# Patient Record
Sex: FEMALE | Race: WHITE | NOT HISPANIC OR LATINO | Employment: FULL TIME | URBAN - NONMETROPOLITAN AREA
[De-identification: names, ages, dates, MRNs, and addresses within clinical notes are randomized per-mention and may not be internally consistent; named-entity substitution may affect disease eponyms.]

---

## 2017-03-08 DIAGNOSIS — N91.2 AMENORRHEA: Primary | ICD-10-CM

## 2017-03-08 LAB — B-HCG UR QL: NEGATIVE

## 2017-03-08 PROCEDURE — 81025 URINE PREGNANCY TEST: CPT | Performed by: INTERNAL MEDICINE

## 2017-07-14 ENCOUNTER — OFFICE VISIT (OUTPATIENT)
Dept: OBSTETRICS AND GYNECOLOGY | Facility: CLINIC | Age: 32
End: 2017-07-14

## 2017-07-14 VITALS
HEART RATE: 81 BPM | DIASTOLIC BLOOD PRESSURE: 64 MMHG | RESPIRATION RATE: 16 BRPM | HEIGHT: 69 IN | WEIGHT: 155.8 LBS | SYSTOLIC BLOOD PRESSURE: 102 MMHG | BODY MASS INDEX: 23.08 KG/M2

## 2017-07-14 DIAGNOSIS — N76.0 BV (BACTERIAL VAGINOSIS): Primary | ICD-10-CM

## 2017-07-14 DIAGNOSIS — B96.89 BV (BACTERIAL VAGINOSIS): Primary | ICD-10-CM

## 2017-07-14 DIAGNOSIS — Z97.5 IUD (INTRAUTERINE DEVICE) IN PLACE: ICD-10-CM

## 2017-07-14 PROCEDURE — 87210 SMEAR WET MOUNT SALINE/INK: CPT | Performed by: NURSE PRACTITIONER

## 2017-07-14 PROCEDURE — 99213 OFFICE O/P EST LOW 20 MIN: CPT | Performed by: NURSE PRACTITIONER

## 2017-07-14 RX ORDER — METRONIDAZOLE 500 MG/1
500 TABLET ORAL 2 TIMES DAILY
Qty: 14 TABLET | Refills: 0 | Status: SHIPPED | OUTPATIENT
Start: 2017-07-14 | End: 2017-07-21

## 2017-07-14 RX ORDER — FLUCONAZOLE 150 MG/1
150 TABLET ORAL ONCE
Qty: 2 TABLET | Refills: 0 | Status: SHIPPED | OUTPATIENT
Start: 2017-07-14 | End: 2018-02-16 | Stop reason: SDUPTHER

## 2017-07-14 NOTE — PROGRESS NOTES
Subjective   Aide Jenkins is a 31 y.o. female.     History of Present Illness   Pt presents with complaints of cloudy/yellow vaginal discharge, external itching and significant cramping x 1 week. Pt has Mirena IUD. Placed 8/18/16 by Dr. Smith. She also has not been able to feel strings despite being able to feel them before. Denies spotting or bleeding.     Pt is due for well woman but will reschedule due to current concerns.     The following portions of the patient's history were reviewed and updated as appropriate: allergies, current medications, past family history, past medical history, past social history, past surgical history and problem list.    Review of Systems   Constitutional: Negative.  Negative for chills and fever.   Respiratory: Negative.    Cardiovascular: Negative.    Gastrointestinal: Negative.  Negative for abdominal pain, constipation and diarrhea.   Genitourinary: Positive for pelvic pain and vaginal discharge. Negative for dyspareunia, dysuria, genital sores, menstrual problem, urgency, vaginal bleeding and vaginal pain.   Skin: Negative.    Neurological: Negative for dizziness, syncope, light-headedness and headaches.   Psychiatric/Behavioral: Negative for suicidal ideas. The patient is nervous/anxious.        Objective   Physical Exam   Constitutional: She is oriented to person, place, and time. She appears well-developed and well-nourished.   Cardiovascular: Normal rate, regular rhythm and normal heart sounds.    Pulmonary/Chest: Effort normal and breath sounds normal.   Abdominal: Soft. Normal appearance and bowel sounds are normal. There is no tenderness.   Genitourinary: There is no rash, tenderness, lesion or injury on the right labia. There is no rash, tenderness, lesion or injury on the left labia. Uterus is tender. Uterus is not deviated and not enlarged. Cervix exhibits no motion tenderness and no discharge. Right adnexum displays no mass, no tenderness and no fullness.  Left adnexum displays no mass, no tenderness and no fullness. No erythema, tenderness or bleeding in the vagina. No foreign body in the vagina. Vaginal discharge (copious yellow/cloudy discharge, mild odor, wet prep obtained) found.   Genitourinary Comments: IUD string visualized 2 cm from cervical os. Wet prep results: clue cells TNTC, numerous WBC, no yeast buds, hyphae, or trichomonads. Evaluated by TALIB Pastor.    Neurological: She is alert and oriented to person, place, and time.   Psychiatric: She has a normal mood and affect. Her behavior is normal.   Vitals reviewed.      Assessment/Plan   Aide was seen today for vaginal discharge, vulvar itching and pelvic pain.    Diagnoses and all orders for this visit:    BV (bacterial vaginosis)  -     metroNIDAZOLE (FLAGYL) 500 MG tablet; Take 1 tablet by mouth 2 (Two) Times a Day for 7 days. No alcohol up to 48 hours after last dose  -     fluconazole (DIFLUCAN) 150 MG tablet; Take 1 tablet by mouth 1 (One) Time for 1 dose. Repeat dose in 72 hours if still symptomatic    IUD (intrauterine device) in place     1. Flagyl 500mg BID x 7 days. Discussed NO alcohol up to 48 hrs after last dose of Flagyl. Pt verbalizes understanding. Discussed vulvar hygiene at length.  If symptoms persist or pelvic pain worsens, consider further testing and US.   2. IUD in place. Continue to check strings. RTC with any concerns or schedule pap smear at earliest convenience. Pt is in agreement with plan of care.

## 2017-07-19 ENCOUNTER — OFFICE VISIT (OUTPATIENT)
Dept: FAMILY MEDICINE CLINIC | Facility: CLINIC | Age: 32
End: 2017-07-19

## 2017-07-19 VITALS
DIASTOLIC BLOOD PRESSURE: 60 MMHG | TEMPERATURE: 99.2 F | HEIGHT: 69 IN | BODY MASS INDEX: 23.28 KG/M2 | HEART RATE: 64 BPM | WEIGHT: 157.2 LBS | SYSTOLIC BLOOD PRESSURE: 102 MMHG

## 2017-07-19 DIAGNOSIS — M77.11 LATERAL EPICONDYLITIS OF RIGHT ELBOW: Primary | ICD-10-CM

## 2017-07-19 PROCEDURE — 99213 OFFICE O/P EST LOW 20 MIN: CPT | Performed by: INTERNAL MEDICINE

## 2017-07-19 PROCEDURE — 96372 THER/PROPH/DIAG INJ SC/IM: CPT | Performed by: INTERNAL MEDICINE

## 2017-07-19 RX ORDER — METHYLPREDNISOLONE ACETATE 80 MG/ML
80 INJECTION, SUSPENSION INTRA-ARTICULAR; INTRALESIONAL; INTRAMUSCULAR; SOFT TISSUE ONCE
Status: COMPLETED | OUTPATIENT
Start: 2017-07-19 | End: 2017-07-19

## 2017-07-19 RX ORDER — MELOXICAM 15 MG/1
15 TABLET ORAL DAILY PRN
Qty: 30 TABLET | Refills: 11 | Status: SHIPPED | OUTPATIENT
Start: 2017-07-19 | End: 2017-11-20

## 2017-07-19 RX ADMIN — METHYLPREDNISOLONE ACETATE 80 MG: 80 INJECTION, SUSPENSION INTRA-ARTICULAR; INTRALESIONAL; INTRAMUSCULAR; SOFT TISSUE at 13:48

## 2017-07-19 NOTE — PROGRESS NOTES
"Subjective        History of Present Illness     Aide Jenkins is a 31 y.o. female here today reporting acute onset of right lateral elbow pain 4-5 weeks ago. She has difficulty with hyperextending the right upper extremity making it difficult for her to  her 1-year-old son.  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  She has not taken anything for the discomfort.  Exam reveals symptoms consistent with right lateral epicondylitis, which I recommended she try to reposition to support the elbow or try a compression sleeve.  She states this is difficulty since her job requires her to stand at all times. She will try anti-inflammatory Mobic and we will give her a steroid shot today in the right deltoid. We may refer to orthopedist if she doesn't get relief with this plan.        Review of Systems   Constitutional: Negative for chills, fatigue and fever.   HENT: Negative for congestion, ear pain, postnasal drip, sinus pressure and sore throat.    Respiratory: Negative for cough, shortness of breath and wheezing.    Cardiovascular: Negative for chest pain, palpitations and leg swelling.   Gastrointestinal: Negative for abdominal pain, blood in stool, constipation, diarrhea, nausea and vomiting.   Endocrine: Negative for cold intolerance, heat intolerance, polydipsia and polyuria.   Genitourinary: Negative for dysuria, frequency, hematuria and urgency.   Musculoskeletal:        Right elbow pain.    Skin: Negative for rash.   Neurological: Negative for syncope and weakness.      PHQ-9 Depression Screening 7/19/2017   Little interest or pleasure in doing things 0   Feeling down, depressed, or hopeless 0   PHQ-9 Total Score 0       Objective     Vitals:    07/19/17 1319   BP: 102/60   Pulse: 64   Temp: 99.2 °F (37.3 °C)   TempSrc: Oral   Weight: 157 lb 3.2 oz (71.3 kg)   Height: 69\" (175.3 cm) "       Physical Exam   Constitutional: She is oriented to person, place, and time. She appears well-developed and well-nourished. No distress.   HENT:   Head: Normocephalic and atraumatic.   Nose: Nose normal.   Mouth/Throat: Oropharynx is clear and moist. No oropharyngeal exudate.   Eyes: EOM are normal. Pupils are equal, round, and reactive to light.   Neck: Neck supple. No JVD present. No thyromegaly present.   Cardiovascular: Normal rate, regular rhythm and normal heart sounds.    Pulmonary/Chest: Effort normal and breath sounds normal. No accessory muscle usage. No respiratory distress. She has no wheezes. She has no rales.   Abdominal: Soft. Bowel sounds are normal. She exhibits no distension. There is no tenderness.   Musculoskeletal: She exhibits no edema.   Lymphadenopathy:     She has no cervical adenopathy.   Neurological: She is alert and oriented to person, place, and time. No cranial nerve deficit.   Psychiatric: She has a normal mood and affect. Her speech is normal and behavior is normal.       Assessment/Plan      Recommended a compression sleeve and also trying to reposition the right elbow to give support.   If pain does not resolve with steroids and NSAIDs, we can refer to orthopedist.       Mobic 15 mg q.d. with food.  Monitor for gastritis symptoms.    After informed verbal consent, patient is given Depo-Medrol 80 mg IM in right deltoid without difficulty or complications.        Scribed for Dr. Cronin by Rosa Suarez [facility].     Diagnoses and all orders for this visit:    Lateral epicondylitis of right elbow  -     methylPREDNISolone acetate (DEPO-medrol) injection 80 mg; Inject 1 mL into the shoulder, thigh, or buttocks 1 (One) Time.    Other orders  -     meloxicam (MOBIC) 15 MG tablet; Take 1 tablet by mouth Daily As Needed (pain). Take with food      No visits with results within 3 Week(s) from this visit.  Latest known visit with results is:    Orders Only on 03/08/2017   Component  Date Value Ref Range Status   • HCG, Urine QL 03/08/2017 Negative  Negative Final   ]

## 2017-10-25 DIAGNOSIS — N76.0 BV (BACTERIAL VAGINOSIS): ICD-10-CM

## 2017-10-25 DIAGNOSIS — B96.89 BV (BACTERIAL VAGINOSIS): ICD-10-CM

## 2017-10-25 RX ORDER — METRONIDAZOLE 500 MG/1
TABLET ORAL
Qty: 14 TABLET | Refills: 0 | OUTPATIENT
Start: 2017-10-25

## 2017-11-20 ENCOUNTER — LAB (OUTPATIENT)
Dept: LAB | Facility: OTHER | Age: 32
End: 2017-11-20

## 2017-11-20 ENCOUNTER — OFFICE VISIT (OUTPATIENT)
Dept: FAMILY MEDICINE CLINIC | Facility: CLINIC | Age: 32
End: 2017-11-20

## 2017-11-20 VITALS
HEIGHT: 69 IN | HEART RATE: 96 BPM | SYSTOLIC BLOOD PRESSURE: 120 MMHG | WEIGHT: 154.6 LBS | BODY MASS INDEX: 22.9 KG/M2 | TEMPERATURE: 98.6 F | DIASTOLIC BLOOD PRESSURE: 60 MMHG

## 2017-11-20 DIAGNOSIS — M54.50 ACUTE BILATERAL LOW BACK PAIN WITHOUT SCIATICA: Primary | ICD-10-CM

## 2017-11-20 DIAGNOSIS — R30.0 DYSURIA: ICD-10-CM

## 2017-11-20 LAB
BILIRUB UR QL STRIP: NEGATIVE
CLARITY UR: CLEAR
COLOR UR: YELLOW
GLUCOSE UR STRIP-MCNC: NEGATIVE MG/DL
HGB UR QL STRIP.AUTO: NEGATIVE
KETONES UR QL STRIP: NEGATIVE
LEUKOCYTE ESTERASE UR QL STRIP.AUTO: NEGATIVE
NITRITE UR QL STRIP: NEGATIVE
PH UR STRIP.AUTO: 7 [PH] (ref 5.5–8)
PROT UR QL STRIP: NEGATIVE
SP GR UR STRIP: 1.01 (ref 1–1.03)
UROBILINOGEN UR QL STRIP: NORMAL

## 2017-11-20 PROCEDURE — 99213 OFFICE O/P EST LOW 20 MIN: CPT | Performed by: INTERNAL MEDICINE

## 2017-11-20 PROCEDURE — 81003 URINALYSIS AUTO W/O SCOPE: CPT | Performed by: INTERNAL MEDICINE

## 2017-11-20 RX ORDER — NAPROXEN 500 MG/1
500 TABLET ORAL 2 TIMES DAILY WITH MEALS
Qty: 50 TABLET | Refills: 0 | Status: SHIPPED | OUTPATIENT
Start: 2017-11-20 | End: 2018-04-02

## 2017-11-20 NOTE — PROGRESS NOTES
"Subjective        History of Present Illness     Aide Jenkins is a 32 y.o. female who comes in today reporting acute onset of constant lower bilateral back pain two days ago on Saturday afternoon.  She was sitting in the floor and one of her children sat down in her lap on Saturday when she first noticed mild pain, which later progressed.  She denies radicular pain.  Pain is worse with transferring positions.  Flexing the legs decreases pain.  She reports she is now having abdominal/pevlic discomfort/pressure as well.  She has a Mirena in place.  She has some tenderness over the bladder on exam today.  She denies vaginal discharge.   She reports slightly cloudy urine, but denies urinary burning or frequency.  She has been taking Ibuprofen 800 mg approximately twice daily without adequate relief of pain.  No fevers or chills.  She works as a pharmacist at [employer].  She called in for a sick day today due to the level of the pain.  We will give her a work excuse for today and tomorrow.          Review of Systems   Constitutional: Negative for chills, fatigue and fever.   HENT: Negative for congestion, ear pain, postnasal drip, sinus pressure and sore throat.    Respiratory: Negative for cough, shortness of breath and wheezing.    Cardiovascular: Negative for chest pain, palpitations and leg swelling.   Gastrointestinal: Positive for abdominal pain. Negative for blood in stool, constipation, diarrhea, nausea and vomiting.   Endocrine: Negative for cold intolerance, heat intolerance, polydipsia and polyuria.   Genitourinary: Negative for dysuria, frequency, hematuria and urgency.   Musculoskeletal: Positive for back pain.   Skin: Negative for rash.   Neurological: Negative for syncope and weakness.        Objective     Vitals:    11/20/17 1418   BP: 120/60   Pulse: 96   Temp: 98.6 °F (37 °C)   TempSrc: Oral   Weight: 154 lb 9.6 oz (70.1 kg)   Height: 69\" (175.3 cm)     Physical Exam   Constitutional: She is oriented " to person, place, and time. She appears well-developed and well-nourished. No distress.   HENT:   Head: Normocephalic and atraumatic.   Nose: Right sinus exhibits no maxillary sinus tenderness and no frontal sinus tenderness. Left sinus exhibits no maxillary sinus tenderness and no frontal sinus tenderness.   Mouth/Throat: Uvula is midline, oropharynx is clear and moist and mucous membranes are normal. No oral lesions. No tonsillar exudate.   Eyes: Conjunctivae and EOM are normal. Pupils are equal, round, and reactive to light.   Neck: Trachea normal. Neck supple. No JVD present. Carotid bruit is not present. No tracheal deviation present. No thyroid mass and no thyromegaly present.   Cardiovascular: Normal rate, regular rhythm and normal heart sounds.   No extrasystoles are present. PMI is not displaced.    No murmur heard.  Pulmonary/Chest: Effort normal and breath sounds normal. No accessory muscle usage. No respiratory distress. She has no decreased breath sounds. She has no wheezes. She has no rhonchi. She has no rales.   Abdominal: Soft. Bowel sounds are normal. She exhibits no distension. There is no hepatosplenomegaly. There is tenderness.   Tenderness over the bladder.  No bladder distention.      Musculoskeletal:   Exam of the lumbar spine reveals paraspinal muscles are tight.  No vertebral tenderness.  No alignment issues.           Vascular Status -  Her exam exhibits right foot vasculature normal. Her exam exhibits no right foot edema. Her exam exhibits left foot vasculature normal. Her exam exhibits no left foot edema.  Lymphadenopathy:     She has no cervical adenopathy.   Neurological: She is alert and oriented to person, place, and time. No cranial nerve deficit. Coordination normal.   Skin: Skin is warm, dry and intact. No rash noted. No cyanosis. Nails show no clubbing.   Psychiatric: She has a normal mood and affect. Her speech is normal and behavior is normal. Thought content normal.   Vitals  reviewed.      Assessment/Plan      We will send her for urinalysis on her way out today.  We will notify her of results.  A prescription is sent for naproxen 500 mg to take one tablet b.i.d.     If urinalysis is positive, we will treat with antibiotic therapy.  She is given a work excuse for today and tomorrow.       Addendum:  Her urinalysis is completely normal.  I recommend that she see Katarina Eugene Longoria this week to explore a GYN etiology for her symptoms.  If there is no GYN etiology, I want to obtain x-rays of the back, if her symptoms do not resolve soon with NSAIDs.  The patient voices understanding and agreement.       [facility]in [facility].     Diagnoses and all orders for this visit:    Acute bilateral low back pain without sciatica    Dysuria  -     Urinalysis With / Culture If Indicated - Urine, Clean Catch; Future    Other orders  -     naproxen (NAPROSYN) 500 MG tablet; Take 1 tablet by mouth 2 (Two) Times a Day With Meals.        No visits with results within 3 Week(s) from this visit.  Latest known visit with results is:    Orders Only on 03/08/2017   Component Date Value Ref Range Status   • HCG, Urine QL 03/08/2017 Negative  Negative Final   ]

## 2017-12-01 ENCOUNTER — OFFICE VISIT (OUTPATIENT)
Dept: OBSTETRICS AND GYNECOLOGY | Facility: CLINIC | Age: 32
End: 2017-12-01

## 2017-12-01 VITALS
RESPIRATION RATE: 14 BRPM | SYSTOLIC BLOOD PRESSURE: 108 MMHG | DIASTOLIC BLOOD PRESSURE: 62 MMHG | WEIGHT: 151.2 LBS | BODY MASS INDEX: 22.39 KG/M2 | HEIGHT: 69 IN | HEART RATE: 87 BPM

## 2017-12-01 DIAGNOSIS — Z97.5 IUD (INTRAUTERINE DEVICE) IN PLACE: ICD-10-CM

## 2017-12-01 DIAGNOSIS — R10.2 PELVIC PAIN: Primary | ICD-10-CM

## 2017-12-01 PROCEDURE — 99213 OFFICE O/P EST LOW 20 MIN: CPT | Performed by: NURSE PRACTITIONER

## 2017-12-01 NOTE — PROGRESS NOTES
Subjective   Aide Jenkins is a 32 y.o. female.     History of Present Illness   Pt presents with referral from PCP regarding back and pelvic pain that has now resolved. She states 2 weeks ago pain started in her back. Always an underlying ache with sharp, shooting pain with movement. By day 3, pay was radiating around to the pelvis. Pt has Mirena IUD since 8/18/16. No problems with vaginal bleeding or risk of pregnancy. UA was negative. She was given Naproxen and took for 7 days with complete resolution of pain. 0/10 today.      The following portions of the patient's history were reviewed and updated as appropriate: allergies, current medications, past family history, past medical history, past social history, past surgical history and problem list.    Review of Systems   Constitutional: Negative.  Negative for chills and fever.   Respiratory: Negative.    Cardiovascular: Negative.    Gastrointestinal: Negative.  Negative for constipation, diarrhea, nausea and vomiting.   Genitourinary: Positive for pelvic pain (now resolved) and vaginal discharge (chronic, not infectious since Mirena).   Musculoskeletal: Positive for back pain (now resolved).       Objective   Physical Exam   Constitutional: She is oriented to person, place, and time. She appears well-developed and well-nourished.   Cardiovascular: Normal rate, regular rhythm and normal heart sounds.    Pulmonary/Chest: Effort normal and breath sounds normal.   Abdominal: There is no CVA tenderness.   Genitourinary: Uterus normal. There is no rash, tenderness or lesion on the right labia. There is no rash, tenderness or lesion on the left labia. Uterus is not tender. Cervix exhibits no motion tenderness and no discharge (IUD strings visualized 2.5 cm from cevical os). Right adnexum displays no mass, no tenderness and no fullness. Left adnexum displays no mass, no tenderness and no fullness. No erythema, tenderness or bleeding in the vagina. Vaginal discharge  (scant clear mucus discharge) found.   Neurological: She is alert and oriented to person, place, and time.   Vitals reviewed.    Brief Urine Lab Results  (Last result in the past 365 days)      Color   Clarity   Blood   Leuk Est   Nitrite   Protein   CREAT   Urine HCG        11/20/17 1511 Yellow Clear Negative Negative Negative Negative               Assessment/Plan   Aide was seen today for pelvic pain.    Diagnoses and all orders for this visit:    Pelvic pain    IUD (intrauterine device) in place       With negative UA and resolution of symptoms, no further evaluation or treatment necessary. IUD in place. RTC PRN. ER after hours. All questions answered.

## 2018-01-30 RX ORDER — METOPROLOL SUCCINATE 25 MG/1
TABLET, EXTENDED RELEASE ORAL
Qty: 30 TABLET | Refills: 2 | OUTPATIENT
Start: 2018-01-30

## 2018-02-16 DIAGNOSIS — N76.0 BV (BACTERIAL VAGINOSIS): ICD-10-CM

## 2018-02-16 DIAGNOSIS — B96.89 BV (BACTERIAL VAGINOSIS): ICD-10-CM

## 2018-02-16 RX ORDER — FLUCONAZOLE 150 MG/1
TABLET ORAL
Qty: 2 TABLET | Refills: 0 | Status: SHIPPED | OUTPATIENT
Start: 2018-02-16 | End: 2018-04-02

## 2018-04-02 ENCOUNTER — OFFICE VISIT (OUTPATIENT)
Dept: OBSTETRICS AND GYNECOLOGY | Facility: CLINIC | Age: 33
End: 2018-04-02

## 2018-04-02 VITALS
HEIGHT: 69 IN | BODY MASS INDEX: 22.28 KG/M2 | HEART RATE: 90 BPM | SYSTOLIC BLOOD PRESSURE: 100 MMHG | WEIGHT: 150.4 LBS | DIASTOLIC BLOOD PRESSURE: 64 MMHG | RESPIRATION RATE: 14 BRPM

## 2018-04-02 DIAGNOSIS — Z01.419 ENCOUNTER FOR GYNECOLOGICAL EXAMINATION WITH PAPANICOLAOU SMEAR OF CERVIX: Primary | ICD-10-CM

## 2018-04-02 PROCEDURE — 87624 HPV HI-RISK TYP POOLED RSLT: CPT | Performed by: NURSE PRACTITIONER

## 2018-04-02 PROCEDURE — 88141 CYTOPATH C/V INTERPRET: CPT | Performed by: PATHOLOGY

## 2018-04-02 PROCEDURE — 99395 PREV VISIT EST AGE 18-39: CPT | Performed by: NURSE PRACTITIONER

## 2018-04-02 NOTE — PROGRESS NOTES
Subjective   Chief Complaint   Patient presents with   • Gynecologic Exam     well woman annual visit     Aide Jenkins is a 32 y.o. year old  presenting to be seen for her annual exam.  Previously she had concerns about increased vaginal discharge and pelvic pain.  Chronic mucous like discharge continues without itching, burning, or odor.  Pelvic pain resolved.    No LMP recorded (lmp unknown). Patient has had an implant.    OB History      Para Term  AB Living    2 2        SAB TAB Ectopic Molar Multiple Live Births                   Current birth control method: IUD - Mirena.    She is sexually active.  In the past 12 months there has not been new sexual partners.  Condoms are not typically used.  She would not like to be screened for STD's at today's exam.     Past 6 month menstrual history:    Cycle Frequency: absent   Menstrual cycle character: flow has been absent   Cycle Duration: 0 - 0   Number of heavy days of flows: 0   Dysmenorrhea: none   PMS: none   Intermenstrual bleeding present: no   Post-coital bleeding present: no     She exercises regularly: yes.  She wears her seat belt:yes.  She has concerns about domestic violence: no.  Last colonoscopy: N/A  Last DEXA: N/A  Last MMG:N/A  Last pap: 11/11/15  History of abnormal PAP: No    The following portions of the patient's history were reviewed and updated as appropriate:problem list, current medications, allergies, past family history, past medical history, past social history and past surgical history.    Smoking status: Never Smoker                                                              Smokeless tobacco: Never Used                        History   Alcohol Use No      Review of Systems   Constitutional: Negative.  Negative for chills and fever.   Respiratory: Negative.    Cardiovascular: Negative.    Gastrointestinal: Negative.  Negative for abdominal pain, constipation, diarrhea, nausea and vomiting.   Endocrine: Negative.   "  Genitourinary: Positive for vaginal discharge. Negative for dysuria, frequency, pelvic pain and vaginal bleeding.        Hx of HPV genital warts in college, no current s/s   Skin: Negative.    Neurological: Negative for dizziness, syncope, light-headedness and headaches.   Psychiatric/Behavioral: Negative for suicidal ideas. The patient is not nervous/anxious.          Objective   /64 (BP Location: Right arm, Patient Position: Sitting, Cuff Size: Adult)   Pulse 90   Resp 14   Ht 175.3 cm (69\")   Wt 68.2 kg (150 lb 6.4 oz)   LMP  (LMP Unknown)   Breastfeeding? No   BMI 22.21 kg/m²     General:  well developed; well nourished  no acute distress   Skin:  No suspicious lesions seen   Thyroid: normal to inspection and palpation   Breasts:  Examined in supine position  Symmetric without masses or skin dimpling  Nipples normal without inversion, lesions or discharge  There are no palpable axillary nodes  Fibrocystic changes are present both breasts without a discrete mass   Abdomen: soft, non-tender; no masses  no umbilical or inginual hernias are present  no hepato-splenomegaly   Cardiac: Heart sounds are normal.  Regular rate and rhythm without murmur, gallop or rub.   Resp: Normal expansion.  Clear to auscultation.  No rales, rhonchi, or wheezing.   Psych: alert,oriented, in NAD with a full range of affect, normal behavior and no psychotic features   Pelvis: Uterus:  Clinical staff was present for exam  External genitalia:  normal appearance of the external genitalia including Bartholin's and [place]'s glands.  :  urethral meatus normal;  Vaginal:  normal pink mucosa without prolapse or lesions and discharge present -  mucousy and clear  Cervix:  normal appearance. IUD string present - 2 cms in length;  Uterus:  normal size, shape and consistency  Adnexa:  tenderness of the right adnexa to  deep palpation;  Rectal:  digital rectal exam not performed; anus visually normal appearing.     Lab Review   No " data reviewed    Imaging  No data reviewed       Diagnoses and all orders for this visit:    Encounter for gynecological examination with Papanicolaou smear of cervix  -     Liquid-based Pap Smear, Screening  Pap results: I will send card in mail or call if abnormal. RTC annually for well woman exams. Monitor changes in discharge and RTC PRN.       This note was electronically signed.    Katarina Moraes, APRN  April 2, 2018

## 2018-04-06 LAB
LAB AP CASE REPORT: ABNORMAL
LAB AP GYN ADDITIONAL INFORMATION: ABNORMAL
LAB AP GYN OTHER FINDINGS: ABNORMAL
Lab: ABNORMAL
PATH INTERP SPEC-IMP: ABNORMAL
STAT OF ADQ CVX/VAG CYTO-IMP: ABNORMAL

## 2018-04-10 LAB — HPV I/H RISK 4 DNA CVX QL PROBE+SIG AMP: NEGATIVE

## 2018-04-27 ENCOUNTER — LAB (OUTPATIENT)
Dept: LAB | Facility: OTHER | Age: 33
End: 2018-04-27

## 2018-04-27 ENCOUNTER — OFFICE VISIT (OUTPATIENT)
Dept: FAMILY MEDICINE CLINIC | Facility: CLINIC | Age: 33
End: 2018-04-27

## 2018-04-27 VITALS
DIASTOLIC BLOOD PRESSURE: 70 MMHG | WEIGHT: 150 LBS | TEMPERATURE: 98.1 F | HEART RATE: 68 BPM | SYSTOLIC BLOOD PRESSURE: 120 MMHG | BODY MASS INDEX: 22.22 KG/M2 | HEIGHT: 69 IN

## 2018-04-27 DIAGNOSIS — R00.2 PALPITATIONS: Chronic | ICD-10-CM

## 2018-04-27 DIAGNOSIS — R00.2 PALPITATIONS: Primary | Chronic | ICD-10-CM

## 2018-04-27 DIAGNOSIS — F41.1 GAD (GENERALIZED ANXIETY DISORDER): Chronic | ICD-10-CM

## 2018-04-27 DIAGNOSIS — R07.89 ATYPICAL CHEST PAIN: ICD-10-CM

## 2018-04-27 DIAGNOSIS — D64.9 NORMOCYTIC ANEMIA: Chronic | ICD-10-CM

## 2018-04-27 DIAGNOSIS — K58.0 IRRITABLE BOWEL SYNDROME WITH DIARRHEA: Chronic | ICD-10-CM

## 2018-04-27 LAB
ALBUMIN SERPL-MCNC: 4.7 G/DL (ref 3.2–5.5)
ALBUMIN/GLOB SERPL: 1.5 G/DL (ref 1–3)
ALP SERPL-CCNC: 50 U/L (ref 15–121)
ALT SERPL W P-5'-P-CCNC: 10 U/L (ref 10–60)
ANION GAP SERPL CALCULATED.3IONS-SCNC: 9 MMOL/L (ref 5–15)
AST SERPL-CCNC: 15 U/L (ref 10–60)
BASOPHILS # BLD AUTO: 0.04 10*3/MM3 (ref 0–0.2)
BASOPHILS NFR BLD AUTO: 0.8 % (ref 0–2)
BILIRUB SERPL-MCNC: 0.6 MG/DL (ref 0.2–1)
BUN BLD-MCNC: 10 MG/DL (ref 8–25)
BUN/CREAT SERPL: 14.3 (ref 7–25)
CALCIUM SPEC-SCNC: 9.4 MG/DL (ref 8.4–10.8)
CHLORIDE SERPL-SCNC: 105 MMOL/L (ref 100–112)
CO2 SERPL-SCNC: 27 MMOL/L (ref 20–32)
CREAT BLD-MCNC: 0.7 MG/DL (ref 0.4–1.3)
DEPRECATED RDW RBC AUTO: 39.4 FL (ref 36.4–46.3)
EOSINOPHIL # BLD AUTO: 0.1 10*3/MM3 (ref 0–0.7)
EOSINOPHIL NFR BLD AUTO: 2.1 % (ref 0–7)
ERYTHROCYTE [DISTWIDTH] IN BLOOD BY AUTOMATED COUNT: 12.7 % (ref 11.5–14.5)
GFR SERPL CREATININE-BSD FRML MDRD: 96 ML/MIN/1.73 (ref 64–149)
GLOBULIN UR ELPH-MCNC: 3.2 GM/DL (ref 2.5–4.6)
GLUCOSE BLD-MCNC: 99 MG/DL (ref 70–100)
HCT VFR BLD AUTO: 39.4 % (ref 35–45)
HGB BLD-MCNC: 13.1 G/DL (ref 12–15.5)
LYMPHOCYTES # BLD AUTO: 1.34 10*3/MM3 (ref 0.6–4.2)
LYMPHOCYTES NFR BLD AUTO: 28 % (ref 10–50)
MCH RBC QN AUTO: 29.1 PG (ref 26.5–34)
MCHC RBC AUTO-ENTMCNC: 33.2 G/DL (ref 31.4–36)
MCV RBC AUTO: 87.6 FL (ref 80–98)
MONOCYTES # BLD AUTO: 0.36 10*3/MM3 (ref 0–0.9)
MONOCYTES NFR BLD AUTO: 7.5 % (ref 0–12)
NEUTROPHILS # BLD AUTO: 2.95 10*3/MM3 (ref 2–8.6)
NEUTROPHILS NFR BLD AUTO: 61.6 % (ref 37–80)
PLATELET # BLD AUTO: 210 10*3/MM3 (ref 150–450)
PMV BLD AUTO: 9.7 FL (ref 8–12)
POTASSIUM BLD-SCNC: 3.7 MMOL/L (ref 3.4–5.4)
PROT SERPL-MCNC: 7.9 G/DL (ref 6.7–8.2)
RBC # BLD AUTO: 4.5 10*6/MM3 (ref 3.77–5.16)
SODIUM BLD-SCNC: 141 MMOL/L (ref 134–146)
TSH SERPL DL<=0.05 MIU/L-ACNC: 0.92 MIU/ML (ref 0.46–4.68)
WBC NRBC COR # BLD: 4.79 10*3/MM3 (ref 3.2–9.8)

## 2018-04-27 PROCEDURE — 99214 OFFICE O/P EST MOD 30 MIN: CPT | Performed by: INTERNAL MEDICINE

## 2018-04-27 PROCEDURE — 84443 ASSAY THYROID STIM HORMONE: CPT | Performed by: INTERNAL MEDICINE

## 2018-04-27 PROCEDURE — 80053 COMPREHEN METABOLIC PANEL: CPT | Performed by: INTERNAL MEDICINE

## 2018-04-27 PROCEDURE — 85025 COMPLETE CBC W/AUTO DIFF WBC: CPT | Performed by: INTERNAL MEDICINE

## 2018-04-27 PROCEDURE — 36415 COLL VENOUS BLD VENIPUNCTURE: CPT | Performed by: INTERNAL MEDICINE

## 2018-04-27 RX ORDER — BUSPIRONE HYDROCHLORIDE 10 MG/1
10 TABLET ORAL 3 TIMES DAILY
Qty: 90 TABLET | Refills: 11 | Status: SHIPPED | OUTPATIENT
Start: 2018-04-27 | End: 2018-06-01

## 2018-04-27 RX ORDER — ONDANSETRON 4 MG/1
4 TABLET, FILM COATED ORAL EVERY 8 HOURS PRN
Qty: 30 TABLET | Refills: 0 | Status: SHIPPED | OUTPATIENT
Start: 2018-04-27 | End: 2018-07-18

## 2018-04-27 NOTE — PROGRESS NOTES
Subjective        History of Present Illness     Aide Jenkins is a 32 y.o. female with history of ASD repair in 2013 who reports an episodic sharp, atypical chest pain occasionally with some chest pressure and minimal shortness of breath/rapid breathing, which occurs when she is at work.  Heart rate is usually a little rapid when this occurs.  We are discussing the possibility that she may be experiencing some stress related mild panic attacks at time, and she is in agreement.  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 She also has two young children.  She reports her symptoms improve quite a bit if she can take a break/rest and are almost nonexistent when she is not at work.  She had recent extensive cardiac workup at [facility] on April 6th. Palpitations were noted, but she had no significant arrhythmia noted on Holter monitor.  She has rare premature atrial and ventricular ectopy. However, symptoms were not severe enough to warrant medical therapy.   Echocardiogram March 2018 revealed normal LV function with no atrial septal defect noted by agitated saline injection. She continues to drink caffienated beverages, for which I recommended she completely discontinue.  She tends to avoid caffeine when she is not at work.  She feels that her exercise tolerance is poor, but she reports she does not get normal exercise.  She has been tried on a beta blocker Metoprolol  ER 25 mg daily, which lowered her blood pressure excessively, with systolic pressures sometimes in the 80s.  She could not tolerate the beta blocker.  She was also tried on magnesium supplement without improvement in symptoms.    She was tried on Lexapro or Celexa in the past, but was intolerant due to nausea.  She doesn't really report depressive symptoms, but does admit to a very high level of stress as above. She feels like she had a recent mild panic  "attack at work.  We discussed treatment for the anxiety.  She sleeps well on the nights her young children sleep well.  We discussed options.  I recommended BuSpar and she is in agreement. She reports persistent abdominal discomfort and episodic diarrhea, which she also attributes to the stress.  Symptoms are relieved with defecation.     She has a history of anemia and hasn't had blood work in quite some time.  We will have her stop by the lab for CBC, CMP, and TSH.    Review of Systems   Constitutional: Negative for chills, fatigue and fever.   HENT: Negative for congestion, ear pain, postnasal drip, sinus pressure and sore throat.    Respiratory: Negative for cough, shortness of breath and wheezing.    Cardiovascular: Negative for chest pain, palpitations and leg swelling.   Gastrointestinal: Positive for diarrhea. Negative for abdominal pain, blood in stool, constipation, nausea and vomiting.   Endocrine: Negative for cold intolerance, heat intolerance, polydipsia and polyuria.   Genitourinary: Negative for dysuria, frequency, hematuria and urgency.   Skin: Negative for rash.   Neurological: Negative for syncope and weakness.   Psychiatric/Behavioral: The patient is nervous/anxious.         Objective     Vitals:    04/27/18 1038   BP: 120/70   Pulse: 68   Temp: 98.1 °F (36.7 °C)   TempSrc: Oral   Weight: 68 kg (150 lb)   Height: 175.3 cm (69\")     Physical Exam   Constitutional: She is oriented to person, place, and time. She appears well-developed and well-nourished. No distress.   Intelligent, articulate.  Accompanied by HER-2 young sons.   HENT:   Head: Normocephalic and atraumatic.   Nose: Nose normal.   Mouth/Throat: Oropharynx is clear and moist. No oropharyngeal exudate.   Eyes: EOM are normal. Pupils are equal, round, and reactive to light.   Neck: Neck supple. No JVD present. No thyromegaly present.   Cardiovascular: Normal rate, regular rhythm and normal heart sounds.    Pulmonary/Chest: Effort normal " and breath sounds normal. No accessory muscle usage. No respiratory distress. She has no wheezes. She has no rales.   Abdominal: Soft. Bowel sounds are normal. She exhibits no distension. There is no tenderness.   Musculoskeletal: She exhibits no edema.   Lymphadenopathy:     She has no cervical adenopathy.   Neurological: She is alert and oriented to person, place, and time. No cranial nerve deficit.   Psychiatric: She has a normal mood and affect. Her speech is normal and behavior is normal. Judgment and thought content normal.   Not tearful.  She does not seem particularly anxious today         Assessment/Plan      She was evaluated by cardiology earlier this month, which revealed palpitations and rare premature atrial and ventricular ectopy.  She was not able to tolerate low-dose metoprolol due to hypotension.     She has a history of anemia and has not had labs in quite some time.  An order is sent for CBC, CMP, and TSH due to the above issues..     For the anxiety, I recommended starting  Buspar taking 1/2 tablet b.i.d. initially, working up to as much as 10 mg t.i.d.   Avoid caffeine completely.  Try to exercise regularly and pursue nonpharmacological measures to reduce symptoms, such as relaxation and stress relief.  A prescription is sent for Zofran 4 mg for any nausea associated with the initiation of Buspar.      If the diarrhea predominant IBS symptoms do not adequately improve, we will consider addressing that.    Return to clinic in 6 weeks to reevaluate.       Scribed for Dr. Cornin by Rosa Suarez [facility].     Diagnoses and all orders for this visit:    Palpitations  -     CBC Auto Differential; Future  -     Comprehensive Metabolic Panel; Future  -     TSH; Future    GERMAINE (generalized anxiety disorder)    Normocytic anemia  -     CBC Auto Differential; Future    Atypical chest pain    Irritable bowel syndrome with diarrhea    Other orders  -     busPIRone (BUSPAR) 10 MG tablet; Take 1 tablet by  mouth 3 (Three) Times a Day.  -     ondansetron (ZOFRAN) 4 MG tablet; Take 1 tablet by mouth Every 8 (Eight) Hours As Needed for Nausea or Vomiting.        No visits with results within 3 Week(s) from this visit.   Latest known visit with results is:   Office Visit on 04/02/2018   Component Date Value Ref Range Status   • Case Report 04/06/2018    Final                    Value:Gynecologic Cytology Report                       Case: [id number]                                  Authorizing Provider:  Katarina Ospina              Collected:           04/02/2018 09:45 AM                                 JASWINDER Moraes                                                         Ordering Location:     [facility]     Received:            04/02/2018 10:46 AM                                 [facility]                                                               First Screen:          Sherin King                                                           Pathologist:           Dylan Hoff MD                                                         Specimen:    Liquid-Based Pap, Screening, Cervix                                                       • Interpretation 04/06/2018 Atypical squamous cells of undetermined significance*   Final   • Other Findings 04/06/2018 Fungal organisms morphologically consistent with Candida spp  Moderate Inflammation   Final   • Specimen Adequacy 04/06/2018 Satisfactory for evaluation, endocervical/transformation zone component present   Final   • Additional Information 04/06/2018    Final                    Value:This result contains rich text formatting which cannot be displayed here.   • HPV Aptima 04/10/2018 Negative  Negative Final   ]

## 2018-06-01 ENCOUNTER — OFFICE VISIT (OUTPATIENT)
Dept: OBSTETRICS AND GYNECOLOGY | Facility: CLINIC | Age: 33
End: 2018-06-01

## 2018-06-01 VITALS
DIASTOLIC BLOOD PRESSURE: 68 MMHG | RESPIRATION RATE: 14 BRPM | BODY MASS INDEX: 22.01 KG/M2 | WEIGHT: 148.6 LBS | HEIGHT: 69 IN | TEMPERATURE: 99.1 F | SYSTOLIC BLOOD PRESSURE: 112 MMHG | HEART RATE: 86 BPM

## 2018-06-01 DIAGNOSIS — N30.90 CYSTITIS: ICD-10-CM

## 2018-06-01 DIAGNOSIS — B37.31 CANDIDAL VULVOVAGINITIS: Primary | ICD-10-CM

## 2018-06-01 LAB
BACTERIA UR QL AUTO: ABNORMAL /HPF
BILIRUB UR QL STRIP: NEGATIVE
CLARITY UR: CLEAR
COLOR UR: YELLOW
GLUCOSE UR STRIP-MCNC: NEGATIVE MG/DL
HGB UR QL STRIP.AUTO: ABNORMAL
HYALINE CASTS UR QL AUTO: ABNORMAL /LPF
KETONES UR QL STRIP: NEGATIVE
LEUKOCYTE ESTERASE UR QL STRIP.AUTO: NEGATIVE
MUCOUS THREADS URNS QL MICRO: ABNORMAL /HPF
NITRITE UR QL STRIP: NEGATIVE
PH UR STRIP.AUTO: 5.5 [PH] (ref 5.5–8)
PROT UR QL STRIP: NEGATIVE
RBC # UR: ABNORMAL /HPF
REF LAB TEST METHOD: ABNORMAL
SP GR UR STRIP: >=1.03 (ref 1–1.03)
SQUAMOUS #/AREA URNS HPF: ABNORMAL /HPF
UROBILINOGEN UR QL STRIP: ABNORMAL
WBC UR QL AUTO: ABNORMAL /HPF
YEAST URNS QL MICRO: ABNORMAL /HPF

## 2018-06-01 PROCEDURE — 87086 URINE CULTURE/COLONY COUNT: CPT | Performed by: NURSE PRACTITIONER

## 2018-06-01 PROCEDURE — 81001 URINALYSIS AUTO W/SCOPE: CPT | Performed by: NURSE PRACTITIONER

## 2018-06-01 PROCEDURE — 99213 OFFICE O/P EST LOW 20 MIN: CPT | Performed by: NURSE PRACTITIONER

## 2018-06-01 PROCEDURE — 87210 SMEAR WET MOUNT SALINE/INK: CPT | Performed by: NURSE PRACTITIONER

## 2018-06-01 RX ORDER — SCOLOPAMINE TRANSDERMAL SYSTEM 1 MG/1
1 PATCH, EXTENDED RELEASE TRANSDERMAL
Qty: 4 PATCH | Refills: 1 | Status: SHIPPED | OUTPATIENT
Start: 2018-06-01 | End: 2018-07-18

## 2018-06-01 RX ORDER — FLUCONAZOLE 150 MG/1
150 TABLET ORAL ONCE
Qty: 2 TABLET | Refills: 1 | Status: SHIPPED | OUTPATIENT
Start: 2018-06-01 | End: 2018-06-01

## 2018-06-01 RX ORDER — NITROFURANTOIN 25; 75 MG/1; MG/1
100 CAPSULE ORAL 2 TIMES DAILY
Qty: 10 CAPSULE | Refills: 0 | Status: SHIPPED | OUTPATIENT
Start: 2018-06-01 | End: 2018-06-06

## 2018-06-02 LAB — BACTERIA SPEC AEROBE CULT: NORMAL

## 2018-06-04 NOTE — PROGRESS NOTES
Subjective   Aide Jenkins is a 32 y.o. female.     History of Present Illness   Pt presents with complaints of vaginal itching and discharge. She has had chronic discharge with Mirena IUD but pap smear 4/2 showed candida. She took Diflucan then and again 1 week ago. Itching improved some with 1 dose but discharge continues. Discharge can be white to yellow, no odor. Pt is also experiencing dysuria and urinary frequency with low back ache and supra pubic pain. 4/10.      The following portions of the patient's history were reviewed and updated as appropriate: allergies, current medications, past family history, past medical history, past social history, past surgical history and problem list.    Review of Systems   Constitutional: Positive for fatigue. Negative for chills. Fever: mild.   Respiratory: Negative.    Cardiovascular: Negative.    Gastrointestinal: Negative.    Genitourinary: Positive for dysuria, frequency, urgency and vaginal discharge (with itching). Negative for decreased urine volume, dyspareunia, flank pain, genital sores, hematuria, pelvic pain (suprapubic pain), vaginal bleeding and vaginal pain.   Musculoskeletal: Positive for back pain (low).   Neurological: Negative for dizziness, syncope and light-headedness.       Objective   Physical Exam   Constitutional: She is oriented to person, place, and time. She appears well-developed and well-nourished. She does not have a sickly appearance. She does not appear ill.   Cardiovascular: Normal rate, regular rhythm and normal heart sounds.    Pulmonary/Chest: Effort normal and breath sounds normal.   Genitourinary: Uterus normal. There is rash (erythema) on the right labia. There is no tenderness, lesion or injury on the right labia. There is rash (erythema) on the left labia. There is no tenderness, lesion or injury on the left labia. Cervix exhibits visible IUD strings. Cervix does not exhibit motion tenderness. Right adnexum displays no mass, no  tenderness and no fullness. Left adnexum displays no mass, no tenderness and no fullness. Vagina exhibits no lesion. There is erythema in the vagina. No tenderness or bleeding in the vagina. No foreign body in the vagina. Vaginal discharge (moderate amount of mildly thick homogenous, white discharge, ) found.   Genitourinary Comments: Wet prep: positive for budding yeast, few hyphae, no clue cells or trichomonads. Evaluated by TALIB Pastor.    Neurological: She is alert and oriented to person, place, and time.   Vitals reviewed.    Brief Urine Lab Results  (Last result in the past 365 days)      Color   Clarity   Blood   Leuk Est   Nitrite   Protein   CREAT   Urine HCG        06/01/18 1333 Yellow Clear Small (1+)(A) Negative Negative Negative             RBC, UA None Seen /HPF 3-5     WBC, UA None Seen /HPF 0-2     Bacteria, UA None Seen /HPF Trace     Squamous Epithelial Cells, UA None Seen, 0-2 /HPF 3-6     Yeast, UA None Seen /HPF Small/1+ Budding Yeast    Hyaline Casts, UA None Seen /LPF None Seen    Mucus, UA None Seen, Trace /HPF Small/1+     Methodology  Manual Light Microscopy    Resulting Agency  [facility]       Assessment/Plan   Aide was seen today for vaginitis, burning with urination, urinary frequency, supra pubic pain and back pain.    Diagnoses and all orders for this visit:    Candidal vulvovaginitis  -     fluconazole (DIFLUCAN) 150 MG tablet; Take 1 tablet by mouth 1 (One) Time for 1 dose. Repeat dose in 72 hours if still symptomatic  Discussed recurrence/resistence to Diflucan as pt continues to have problems. She verbalizes understanding and does wish to try more aggressive treatments. I recommend Boric Acid 600 Vaginal Suppositories. Insert 1 into vagina nightly x 10 nights. #10 no refills. Called to [facility] in [place]. Reviewed vulvar hygiene guidelines and pt is adhering. Discussed possible contributions of the IUD to vaginitis. She does not wish to have removed as  it has greatly improved her menstrual problems. Encouraged use of daily probiotics in prevention of recurrent vaginitis.     Cystitis  -     Urinalysis With / Culture If Indicated - Urine, Clean Catch  -     Urinalysis, Microscopic Only - Urine, Clean Catch; Future  -     Urinalysis, Microscopic Only - Urine, Clean Catch  -     Urine Culture - Urine,; Future  -     Urine Culture - Urine,  -     nitrofurantoin, macrocrystal-monohydrate, (MACROBID) 100 MG capsule; Take 1 capsule by mouth 2 (Two) Times a Day for 5 days.  UA positive for RBC, trace bacteria and yeast. Will treat with Macrobid 100mg BID x 5 days and I will call with culture results to change Rx PRN.     RTC if symptoms persist after full course of treatments. Pt agrees to plan of care.

## 2018-07-18 ENCOUNTER — OFFICE VISIT (OUTPATIENT)
Dept: FAMILY MEDICINE CLINIC | Facility: CLINIC | Age: 33
End: 2018-07-18

## 2018-07-18 VITALS
HEART RATE: 72 BPM | HEIGHT: 69 IN | BODY MASS INDEX: 22.22 KG/M2 | SYSTOLIC BLOOD PRESSURE: 110 MMHG | TEMPERATURE: 98.3 F | WEIGHT: 150 LBS | DIASTOLIC BLOOD PRESSURE: 60 MMHG

## 2018-07-18 DIAGNOSIS — N60.11 FIBROCYSTIC BREAST CHANGES OF BOTH BREASTS: Primary | ICD-10-CM

## 2018-07-18 DIAGNOSIS — F41.1 GAD (GENERALIZED ANXIETY DISORDER): Chronic | ICD-10-CM

## 2018-07-18 DIAGNOSIS — N63.20 BILATERAL BREAST LUMP: ICD-10-CM

## 2018-07-18 DIAGNOSIS — N63.10 BILATERAL BREAST LUMP: ICD-10-CM

## 2018-07-18 DIAGNOSIS — N60.12 FIBROCYSTIC BREAST CHANGES OF BOTH BREASTS: Primary | ICD-10-CM

## 2018-07-18 PROCEDURE — 99213 OFFICE O/P EST LOW 20 MIN: CPT | Performed by: INTERNAL MEDICINE

## 2018-07-18 NOTE — PROGRESS NOTES
Subjective        History of Present Illness     Aide Jenkins is a 32 y.o. female who presents with left-sided breast tenderness for the past three months.  She has history of fibrocystic changes of the left breast.   She noticed the tenderness initially after having a clinical breast exam in April.  She had clinical breast exam by a medical student shadowing Katarina Longoria in April.  She reports the student reported feeling a nodule in the breast, but Katarina felt this was extension of normal breast tissue.  She denies family history of breast cancer.  She has a history of fibrocystic changes in her paternal grandmother.  She also has a family history of prostate cancer.  She has not had a mammogram.  Patient is examined in supine position in the presence of Rosa Suarez, female scribe, present in the room.  Breast exam appears normal other than fibrocystic changes present in bilateral breasts without a discrete mass.  She uses IUD for contraception.      She did not tolerate the Buspar due to severe nausea and dizziness even with a half tablet and even in combination with Zofran.  She feels like her anxiety is currently adequately controlled. We discussed possibly trying an SSRI in the future if needed.      Review of Systems   Constitutional: Negative for chills, fatigue and fever.   HENT: Negative for congestion, ear pain, postnasal drip, sinus pressure and sore throat.    Respiratory: Negative for cough, shortness of breath and wheezing.    Cardiovascular: Negative for chest pain, palpitations and leg swelling.   Gastrointestinal: Negative for abdominal pain, blood in stool, constipation, diarrhea, nausea and vomiting.   Endocrine: Negative for cold intolerance, heat intolerance, polydipsia and polyuria.   Genitourinary: Negative for dysuria, frequency, hematuria and urgency.        Left sided breast tenderness   Skin: Negative for rash.   Neurological: Negative for syncope and weakness.     "    Objective     Vitals:    07/18/18 0821   BP: 110/60   Pulse: 72   Temp: 98.3 °F (36.8 °C)   TempSrc: Oral   Weight: 68 kg (150 lb)   Height: 175.3 cm (69\")     Physical Exam   Constitutional: She is oriented to person, place, and time. She appears well-developed and well-nourished. No distress.   HENT:   Head: Normocephalic and atraumatic.   Nose: Nose normal.   Mouth/Throat: Oropharynx is clear and moist. No oropharyngeal exudate.   Eyes: Pupils are equal, round, and reactive to light. EOM are normal.   Neck: Neck supple. No JVD present. No thyromegaly present.   Cardiovascular: Normal rate, regular rhythm and normal heart sounds.    Pulmonary/Chest: Effort normal and breath sounds normal. No accessory muscle usage. No respiratory distress. She has no wheezes. She has no rales.   Abdominal: Soft. Bowel sounds are normal. She exhibits no distension. There is no tenderness.   Genitourinary:   Genitourinary Comments: Clnical breast exam reveals breasts are symmetrical without masses or skin dimpling.  Nipples normal without inversion, lesions or discharge. No palpable axillary nodes  Fibrocystic changes are present both breasts, left greater than right, without obvious worrisome mass, but unable to rule out suspicious mass due to the numerous fibrocystic changes.  Most of the fibrocystic changes are in the upper outer quadrants     Musculoskeletal: She exhibits no edema.   Lymphadenopathy:     She has no cervical adenopathy.   Neurological: She is alert and oriented to person, place, and time. No cranial nerve deficit.   Psychiatric: She has a normal mood and affect. Her speech is normal and behavior is normal. Judgment and thought content normal.       Assessment/Plan      We will schedule diagnostic mammogram to evaluate the breast tenderness and significant fibrocystic changes.     If she starts to have inadequate control of her anxiety, we can consider an alternative medication since she was intolerant with " the Buspar.     Scribed for Dr. Cronin by Rosa Suarez [facility].     Diagnoses and all orders for this visit:    Fibrocystic breast changes of both breasts    Bilateral breast lump  -     Mammo Diagnostic Digital Tomosynthesis Bilateral With CAD    GERMAINE (generalized anxiety disorder)        No visits with results within 3 Week(s) from this visit.   Latest known visit with results is:   Office Visit on 06/01/2018   Component Date Value Ref Range Status   • Color, UA 06/01/2018 Yellow  Yellow, Straw Final   • Appearance, UA 06/01/2018 Clear  Clear Final   • pH, UA 06/01/2018 5.5  5.5 - 8.0 Final   • Specific Gravity, UA 06/01/2018 >=1.030  1.005 - 1.030 Final   • Glucose, UA 06/01/2018 Negative  Negative Final   • Ketones, UA 06/01/2018 Negative  Negative Final   • Bilirubin, UA 06/01/2018 Negative  Negative Final   • Blood, UA 06/01/2018 Small (1+)* Negative Final   • Protein, UA 06/01/2018 Negative  Negative Final   • Leuk Esterase, UA 06/01/2018 Negative  Negative Final   • Nitrite, UA 06/01/2018 Negative  Negative Final   • Urobilinogen, UA 06/01/2018 0.2 E.U./dL  0.2 - 1.0 E.U./dL Final   • RBC, UA 06/01/2018 3-5* None Seen /HPF Final   • WBC, UA 06/01/2018 0-2* None Seen /HPF Final   • Bacteria, UA 06/01/2018 Trace* None Seen /HPF Final   • Squamous Epithelial Cells, UA 06/01/2018 3-6* None Seen, 0-2 /HPF Final   • Yeast, UA 06/01/2018 Small/1+ Budding Yeast  None Seen /HPF Final   • Hyaline Casts, UA 06/01/2018 None Seen  None Seen /LPF Final   • Mucus, UA 06/01/2018 Small/1+* None Seen, Trace /HPF Final   • Methodology 06/01/2018 Manual Light Microscopy   Final   • Urine Culture 06/01/2018 No growth at 24 hours   Final   ]

## 2018-07-19 ENCOUNTER — TELEPHONE (OUTPATIENT)
Dept: FAMILY MEDICINE CLINIC | Facility: CLINIC | Age: 33
End: 2018-07-19

## 2018-07-19 NOTE — TELEPHONE ENCOUNTER
Advised the patient of mammo results.       ----- Message from Kevyn Cronin MD sent at 7/19/2018  9:18 AM CDT -----  Notify the patient that the mammogram and ultrasound revealed only benign cystic changes.  No evidence of breast cancer or suspicious lesions.  Good news.  EB

## 2018-09-18 RX ORDER — ESCITALOPRAM OXALATE 10 MG/1
10 TABLET ORAL DAILY
Qty: 30 TABLET | Refills: 11 | Status: SHIPPED | OUTPATIENT
Start: 2018-09-18 | End: 2019-01-15

## 2018-10-03 ENCOUNTER — OFFICE VISIT (OUTPATIENT)
Dept: FAMILY MEDICINE CLINIC | Facility: CLINIC | Age: 33
End: 2018-10-03

## 2018-10-03 VITALS
HEART RATE: 89 BPM | SYSTOLIC BLOOD PRESSURE: 120 MMHG | HEIGHT: 69 IN | TEMPERATURE: 98.3 F | DIASTOLIC BLOOD PRESSURE: 80 MMHG | BODY MASS INDEX: 21.92 KG/M2 | OXYGEN SATURATION: 98 % | WEIGHT: 148 LBS

## 2018-10-03 DIAGNOSIS — Q21.10 ATRIAL SEPTAL DEFECT: Chronic | ICD-10-CM

## 2018-10-03 DIAGNOSIS — R00.2 PALPITATIONS: Primary | Chronic | ICD-10-CM

## 2018-10-03 DIAGNOSIS — F41.1 GAD (GENERALIZED ANXIETY DISORDER): Chronic | ICD-10-CM

## 2018-10-03 PROCEDURE — 99214 OFFICE O/P EST MOD 30 MIN: CPT | Performed by: INTERNAL MEDICINE

## 2018-10-03 NOTE — PROGRESS NOTES
Subjective     History of Present Illness     Aide Jenkins is a 33 y.o. female with history of ASD repair in 2013 who reports  episodic sensation of squeezing, atypical chest pain occasionally with some chest pressure and minimal shortness of breath/rapid breathing, which occurs mostly when she is at work, but also at home.  She feels that her heart rate is rapid at those times, but is usually in the upper 80s when she checks it.  On the majority of days, she feels a squeezing-type pain and shortness of breath, but other times, she describes a sharp pain.  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  Dr. Chery is her cardiologist.  It is been his primary opinion that these symptoms are related to PVCs, which were documented on Holter monitor.  She does report sensitivity with pressure to the chest such as her kids laying on her chest.  She denies excessive caffeine use and as a matter of fact has decreased her caffeine intake.  She had extensive cardiac workup at [facility] earlier this spring.  Palpitations were noted, but she had no significant arrhythmia noted on Holter monitor.  She has rare premature atrial and ventricular ectopy.  However, symptoms were not severe enough to warrant medical therapy.  Echocardiogram revealed normal LV function with no atrial septal defect noted by agitated saline injection. Tricuspid regurgitation was noted. She has been tried on a beta blocker Metoprolol  ER 25 mg daily, which lowered her blood pressure excessively, with systolic pressures sometimes in the 80s.  Dr. Chery recommended use of metoprolol only when necessary with symptoms.  She noticed no change with this plan.  She was also tried on magnesium supplement without improvement in symptoms.  I recommended we try a different beta blocker, Bystolic. If no improvement, we will consider referral to electrophysiologist.  She saw a pediatric electrophysiologist as a  "teenager.  We also discussed benefits of conditioning exercise such as couch to 5K.     She started Lexapro 5 mg daily last month and feels this seems to be helping with anxiety somewhat, although, she has a very stressful job as a pharmacist.            Review of Systems   Constitutional: Negative for chills, fatigue and fever.   HENT: Negative for congestion, ear pain, postnasal drip, sinus pressure and sore throat.    Respiratory: Positive for shortness of breath. Negative for cough and wheezing.    Cardiovascular: Positive for chest pain. Negative for palpitations and leg swelling.   Gastrointestinal: Negative for abdominal pain, blood in stool, constipation, diarrhea, nausea and vomiting.   Endocrine: Negative for cold intolerance, heat intolerance, polydipsia and polyuria.   Genitourinary: Negative for dysuria, frequency, hematuria and urgency.   Skin: Negative for rash.   Neurological: Negative for syncope and weakness.        Objective     Vitals:    10/03/18 1319   BP: 120/80   Pulse: 89   Temp: 98.3 °F (36.8 °C)   TempSrc: Oral   SpO2: 98%   Weight: 67.1 kg (148 lb)   Height: 175.3 cm (69\")     Physical Exam   Constitutional: She is oriented to person, place, and time. She appears well-developed and well-nourished. No distress.   HENT:   Head: Normocephalic and atraumatic.   Nose: Nose normal.   Mouth/Throat: Oropharynx is clear and moist. No oropharyngeal exudate.   Eyes: Pupils are equal, round, and reactive to light. EOM are normal.   Neck: Neck supple. No JVD present. No thyromegaly present.   Cardiovascular: Normal rate, regular rhythm and normal heart sounds.    Occasional ectopy.  No bigeminy.   Pulmonary/Chest: Effort normal and breath sounds normal. No accessory muscle usage. No respiratory distress. She has no wheezes. She has no rales.   Abdominal: Soft. Bowel sounds are normal. She exhibits no distension. There is no tenderness.   Musculoskeletal: She exhibits no edema.   Lymphadenopathy:     " She has no cervical adenopathy.   Neurological: She is alert and oriented to person, place, and time. No cranial nerve deficit.   Psychiatric: She has a normal mood and affect. Her speech is normal and behavior is normal. Judgment and thought content normal.     Assessment/Plan      She is given samples of Bystolic 5 mg to take 1/2 tablet daily.  If she doesn't experience hypotensive episodes with the 2.5 mg dose, she may increase to a whole 5 mg tablet daily.  I also recommended exercises to improve conditioning such as couch to 5K.  Remain well hydrated.  Minimize caffeine intake.  If no improvement with a combination of the betablocker and conditioning exercises, we will consider referring her to electrophysiologist.        Continue the Lexapro 5 mg for anxiety.  She could not tolerate BuSpar.    Scribed for Dr. Cronin by Rosa Suarez [facility].     Diagnoses and all orders for this visit:    Palpitations    Atrial septal defect - surgically repaired, 12/2013    GERMAINE (generalized anxiety disorder)        No visits with results within 3 Week(s) from this visit.   Latest known visit with results is:   Office Visit on 06/01/2018   Component Date Value Ref Range Status   • Color, UA 06/01/2018 Yellow  Yellow, Straw Final   • Appearance, UA 06/01/2018 Clear  Clear Final   • pH, UA 06/01/2018 5.5  5.5 - 8.0 Final   • Specific Gravity, UA 06/01/2018 >=1.030  1.005 - 1.030 Final   • Glucose, UA 06/01/2018 Negative  Negative Final   • Ketones, UA 06/01/2018 Negative  Negative Final   • Bilirubin, UA 06/01/2018 Negative  Negative Final   • Blood, UA 06/01/2018 Small (1+)* Negative Final   • Protein, UA 06/01/2018 Negative  Negative Final   • Leuk Esterase, UA 06/01/2018 Negative  Negative Final   • Nitrite, UA 06/01/2018 Negative  Negative Final   • Urobilinogen, UA 06/01/2018 0.2 E.U./dL  0.2 - 1.0 E.U./dL Final   • RBC, UA 06/01/2018 3-5* None Seen /HPF Final   • WBC, UA 06/01/2018 0-2* None Seen /HPF Final   • Bacteria,  UA 06/01/2018 Trace* None Seen /HPF Final   • Squamous Epithelial Cells, UA 06/01/2018 3-6* None Seen, 0-2 /HPF Final   • Yeast, UA 06/01/2018 Small/1+ Budding Yeast  None Seen /HPF Final   • Hyaline Casts, UA 06/01/2018 None Seen  None Seen /LPF Final   • Mucus, UA 06/01/2018 Small/1+* None Seen, Trace /HPF Final   • Methodology 06/01/2018 Manual Light Microscopy   Final   • Urine Culture 06/01/2018 No growth at 24 hours   Final   ]

## 2018-10-05 ENCOUNTER — HOSPITAL ENCOUNTER (EMERGENCY)
Facility: HOSPITAL | Age: 33
Discharge: HOME | End: 2018-10-05
Attending: EMERGENCY MEDICINE | Admitting: EMERGENCY MEDICINE

## 2018-10-05 ENCOUNTER — APPOINTMENT (OUTPATIENT)
Dept: GENERAL RADIOLOGY | Facility: HOSPITAL | Age: 33
End: 2018-10-05

## 2018-10-05 VITALS
SYSTOLIC BLOOD PRESSURE: 100 MMHG | HEART RATE: 59 BPM | HEIGHT: 69 IN | TEMPERATURE: 98.5 F | RESPIRATION RATE: 18 BRPM | DIASTOLIC BLOOD PRESSURE: 60 MMHG | WEIGHT: 151.2 LBS | OXYGEN SATURATION: 99 % | BODY MASS INDEX: 22.39 KG/M2

## 2018-10-05 DIAGNOSIS — R00.2 HEART PALPITATIONS: Primary | ICD-10-CM

## 2018-10-05 LAB
ALBUMIN SERPL-MCNC: 4.2 G/DL (ref 3.4–4.8)
ALBUMIN/GLOB SERPL: 1.4 G/DL (ref 1.1–1.8)
ALP SERPL-CCNC: 42 U/L (ref 38–126)
ALT SERPL W P-5'-P-CCNC: 20 U/L (ref 9–52)
ANION GAP SERPL CALCULATED.3IONS-SCNC: 11 MMOL/L (ref 5–15)
AST SERPL-CCNC: 18 U/L (ref 14–36)
BASOPHILS # BLD AUTO: 0.03 10*3/MM3 (ref 0–0.2)
BASOPHILS NFR BLD AUTO: 0.6 % (ref 0–2)
BILIRUB SERPL-MCNC: 0.4 MG/DL (ref 0.2–1.3)
BUN BLD-MCNC: 13 MG/DL (ref 7–21)
BUN/CREAT SERPL: 15.3 (ref 7–25)
CALCIUM SPEC-SCNC: 8.9 MG/DL (ref 8.4–10.2)
CHLORIDE SERPL-SCNC: 100 MMOL/L (ref 95–110)
CK SERPL-CCNC: 61 U/L (ref 30–135)
CO2 SERPL-SCNC: 27 MMOL/L (ref 22–31)
CREAT BLD-MCNC: 0.85 MG/DL (ref 0.5–1)
D-DIMER, QUANTITATIVE (MAD,POW, STR): <270 NG/ML (FEU) (ref 0–470)
DEPRECATED RDW RBC AUTO: 39.2 FL (ref 36.4–46.3)
EOSINOPHIL # BLD AUTO: 0.05 10*3/MM3 (ref 0–0.7)
EOSINOPHIL NFR BLD AUTO: 1 % (ref 0–7)
ERYTHROCYTE [DISTWIDTH] IN BLOOD BY AUTOMATED COUNT: 12.7 % (ref 11.5–14.5)
GFR SERPL CREATININE-BSD FRML MDRD: 77 ML/MIN/1.73 (ref 64–149)
GLOBULIN UR ELPH-MCNC: 3 GM/DL (ref 2.3–3.5)
GLUCOSE BLD-MCNC: 98 MG/DL (ref 60–100)
HCG SERPL QL: NEGATIVE
HCT VFR BLD AUTO: 35.1 % (ref 35–45)
HGB BLD-MCNC: 12.2 G/DL (ref 12–15.5)
IMM GRANULOCYTES # BLD: 0.01 10*3/MM3 (ref 0–0.02)
IMM GRANULOCYTES NFR BLD: 0.2 % (ref 0–0.5)
INR PPP: 1.04 (ref 0.8–1.2)
LYMPHOCYTES # BLD AUTO: 1.72 10*3/MM3 (ref 0.6–4.2)
LYMPHOCYTES NFR BLD AUTO: 32.8 % (ref 10–50)
MAGNESIUM SERPL-MCNC: 1.8 MG/DL (ref 1.6–2.3)
MCH RBC QN AUTO: 29.6 PG (ref 26.5–34)
MCHC RBC AUTO-ENTMCNC: 34.8 G/DL (ref 31.4–36)
MCV RBC AUTO: 85.2 FL (ref 80–98)
MONOCYTES # BLD AUTO: 0.37 10*3/MM3 (ref 0–0.9)
MONOCYTES NFR BLD AUTO: 7.1 % (ref 0–12)
NEUTROPHILS # BLD AUTO: 3.06 10*3/MM3 (ref 2–8.6)
NEUTROPHILS NFR BLD AUTO: 58.3 % (ref 37–80)
NT-PROBNP SERPL-MCNC: 82.8 PG/ML (ref 0–450)
PLATELET # BLD AUTO: 156 10*3/MM3 (ref 150–450)
PMV BLD AUTO: 9.9 FL (ref 8–12)
POTASSIUM BLD-SCNC: 3.7 MMOL/L (ref 3.5–5.1)
PROT SERPL-MCNC: 7.2 G/DL (ref 6.3–8.6)
PROTHROMBIN TIME: 13.4 SECONDS (ref 11.1–15.3)
RBC # BLD AUTO: 4.12 10*6/MM3 (ref 3.77–5.16)
SODIUM BLD-SCNC: 138 MMOL/L (ref 137–145)
TROPONIN I SERPL-MCNC: <0.012 NG/ML
WBC NRBC COR # BLD: 5.24 10*3/MM3 (ref 3.2–9.8)

## 2018-10-05 PROCEDURE — 71046 X-RAY EXAM CHEST 2 VIEWS: CPT

## 2018-10-05 PROCEDURE — 93010 ELECTROCARDIOGRAM REPORT: CPT | Performed by: INTERNAL MEDICINE

## 2018-10-05 PROCEDURE — 93005 ELECTROCARDIOGRAM TRACING: CPT | Performed by: FAMILY MEDICINE

## 2018-10-05 PROCEDURE — 84703 CHORIONIC GONADOTROPIN ASSAY: CPT | Performed by: EMERGENCY MEDICINE

## 2018-10-05 PROCEDURE — 80053 COMPREHEN METABOLIC PANEL: CPT | Performed by: EMERGENCY MEDICINE

## 2018-10-05 PROCEDURE — 96360 HYDRATION IV INFUSION INIT: CPT

## 2018-10-05 PROCEDURE — 99284 EMERGENCY DEPT VISIT MOD MDM: CPT

## 2018-10-05 PROCEDURE — 84484 ASSAY OF TROPONIN QUANT: CPT | Performed by: EMERGENCY MEDICINE

## 2018-10-05 PROCEDURE — 83880 ASSAY OF NATRIURETIC PEPTIDE: CPT | Performed by: EMERGENCY MEDICINE

## 2018-10-05 PROCEDURE — 85379 FIBRIN DEGRADATION QUANT: CPT | Performed by: EMERGENCY MEDICINE

## 2018-10-05 PROCEDURE — 93005 ELECTROCARDIOGRAM TRACING: CPT | Performed by: EMERGENCY MEDICINE

## 2018-10-05 PROCEDURE — 83735 ASSAY OF MAGNESIUM: CPT | Performed by: EMERGENCY MEDICINE

## 2018-10-05 PROCEDURE — 82550 ASSAY OF CK (CPK): CPT | Performed by: EMERGENCY MEDICINE

## 2018-10-05 PROCEDURE — 85610 PROTHROMBIN TIME: CPT | Performed by: EMERGENCY MEDICINE

## 2018-10-05 PROCEDURE — 85025 COMPLETE CBC W/AUTO DIFF WBC: CPT | Performed by: EMERGENCY MEDICINE

## 2018-10-05 RX ORDER — NEBIVOLOL 2.5 MG/1
2.5 TABLET ORAL DAILY
COMMUNITY
End: 2018-12-05 | Stop reason: SINTOL

## 2018-10-05 RX ADMIN — SODIUM CHLORIDE 1000 ML: 900 INJECTION, SOLUTION INTRAVENOUS at 17:45

## 2018-10-05 NOTE — ED TRIAGE NOTES
Pt presents to ED with c/o hypotension and bradycardia since being prescribed bystolic two days ago. Pt reports chest pressure that radiated to left shoulder that started today.

## 2018-10-05 NOTE — ED PROVIDER NOTES
Subjective   33-year-old female presents with palpitations and chest pain.  She has a history of palpitations for which she takes Bistolic and was placed on it 2 days ago.  Patient took some of the medication and she developed dizziness and low blood pressure today.  She said the blood pressure was as low as 80s over 50s.  A bit later she developed substernal chest pain.  Nonradiating.  She got short of breath.  She was dizzy from the low blood pressure.  She has had a long history of cardiac dysrhythmias.  Patient has had an atrial septal defect surgery.  The patient was on metoprolol for palpitations in the past but has been off the medication for about 2 years.  She went to the primary care doctor this week because of continued and recurring palpitations and was placed on the Bistolic.  Upon arrival at the emergency Department her blood pressures improved and she no longer has chest pains.        Chest Pain   Pain location:  Substernal area  Pain quality: pressure    Pain radiates to:  Does not radiate  Pain severity:  Moderate  Onset quality:  Sudden  Progression:  Resolved  Chronicity:  Recurrent  Context: at rest    Relieved by:  Nothing  Worsened by:  Nothing  Associated symptoms: palpitations    Associated symptoms: no abdominal pain, no back pain, no cough, no dizziness, no fatigue, no fever, no nausea, no shortness of breath, no vomiting and no weakness        Review of Systems   Constitutional: Negative for chills, fatigue and fever.   HENT: Negative for congestion and sore throat.    Eyes: Negative for visual disturbance.   Respiratory: Negative for cough and shortness of breath.    Cardiovascular: Positive for chest pain and palpitations. Negative for leg swelling.   Gastrointestinal: Negative for abdominal pain, nausea and vomiting.   Genitourinary: Negative for dysuria.   Musculoskeletal: Negative for back pain.   Skin: Negative for rash.   Neurological: Negative for dizziness and weakness.    Psychiatric/Behavioral: Negative for behavioral problems.   All other systems reviewed and are negative.      Past Medical History:   Diagnosis Date   • Abdominal pain 07/25/2014    undergoing an ultrasound of the gallbladder this next week   • Allergic rhinitis    • Anxiety    • Atrial septal defect     s/p repair December 2013    • Atypical chest pain    • Chest pain    • Chronic pelvic pain in female    • Chronic tension-type headache    • Dyspnea    • Edema, lower extremity    • Encounter for insertion of intrauterine contraceptive device     mirena   • Encounter for removal of intrauterine contraceptive device    • Encounter for routine postpartum follow-up    • Endometriosis    • Fatigue    • Fibrocystic breast    • GERMAINE (generalized anxiety disorder) 4/27/2018   • Heart disease    • Infertility, female    • Irritable bowel syndrome with diarrhea 4/27/2018   • Leukorrhea     not specified as infective   • Migraine variant    • Nausea     episodic   • Normocytic anemia    • Palpitations    • Tachycardia    • Upper respiratory infection    • Urinary tract infection    • Visit for family planning education        Allergies   Allergen Reactions   • Ceclor [Cefaclor] Rash     swelling   • Erythromycin Rash   • Iodine Rash   • Zithromax [Azithromycin] Rash       Past Surgical History:   Procedure Laterality Date   • ASD REPAIR, OSTIUM PRIMUM  2013   • CHOLECYSTECTOMY     • COLONOSCOPY  2012   • INJECTION OF MEDICATION  01/29/2014    kenalog   • WISDOM TOOTH EXTRACTION         Family History   Problem Relation Age of Onset   • Hypertension Maternal Grandmother    • Hypertension Paternal Grandmother    • Coronary artery disease Paternal Grandmother    • Colon cancer Other    • Breast cancer Neg Hx    • Endometrial cancer Neg Hx    • Ovarian cancer Neg Hx        Social History     Social History   • Marital status:      Social History Main Topics   • Smoking status: Never Smoker   • Smokeless tobacco: Never  Used   • Alcohol use No   • Drug use: No   • Sexual activity: Yes     Partners: Male     Birth control/ protection: IUD     Other Topics Concern   • Not on file           Objective   Physical Exam   Constitutional: She is oriented to person, place, and time. She appears well-developed and well-nourished.   HENT:   Head: Normocephalic and atraumatic.   Eyes: Pupils are equal, round, and reactive to light. EOM are normal.   Neck: Normal range of motion. Neck supple.   No midline tenderness   Cardiovascular: Normal rate, regular rhythm, normal heart sounds and intact distal pulses.  Exam reveals no gallop and no friction rub.    No murmur heard.  Pulmonary/Chest: Breath sounds normal. No respiratory distress. She has no wheezes. She has no rales.   No rhonchi   Abdominal: Soft. Bowel sounds are normal. She exhibits no distension. There is no tenderness.   Musculoskeletal: Normal range of motion. She exhibits no tenderness or deformity.   Neurological: She is alert and oriented to person, place, and time. No cranial nerve deficit. She exhibits normal muscle tone. Coordination normal.   Skin: Skin is warm and dry. No rash noted.   Psychiatric: She has a normal mood and affect. Her behavior is normal.   Nursing note and vitals reviewed.      Procedures           ED Course                  MDM  Number of Diagnoses or Management Options  Heart palpitations:   Diagnosis management comments: 7:22 PM the patient is stable.  There've been no further episodes of hypotension or palpitations.  The workup here was completely normal.  The patient will be discharged home and will be given cardiology to follow up with.  We will be stopping the Bistolic       Amount and/or Complexity of Data Reviewed  Clinical lab tests: ordered and reviewed  Tests in the radiology section of CPT®: ordered and reviewed  Tests in the medicine section of CPT®: ordered and reviewed  Decide to obtain previous medical records or to obtain history from  someone other than the patient: yes  Obtain history from someone other than the patient: yes  Review and summarize past medical records: yes  Independent visualization of images, tracings, or specimens: yes    Risk of Complications, Morbidity, and/or Mortality  Presenting problems: high  Diagnostic procedures: high  Management options: high    Patient Progress  Patient progress: improved        Final diagnoses:   Heart palpitations            Willi Howell MD  10/05/18 1924       Willi Howell MD  10/05/18 1926

## 2018-10-15 ENCOUNTER — TELEPHONE (OUTPATIENT)
Dept: FAMILY MEDICINE CLINIC | Facility: CLINIC | Age: 33
End: 2018-10-15

## 2018-10-15 NOTE — TELEPHONE ENCOUNTER
10/15/2018 I was successful in reaching patient today and she states had stopped Bystolic but went to [facility] ER several days ago. She is going to get apt with a different cardiologist and get another opinion. TP      ----- Message from Kevyn Cronin MD sent at 10/10/2018  2:06 PM CDT -----  After receiving this message the other day, I called her phone number and left a voicemail instructing her to stop taking the Bystolic.  Call to check on her to make sure the low blood pressures resolved by stopping the Bystolic.  I also asked her to consider letting me send her to an electrophysiologist in [place].  See if she has made a decision about that.  EB  ----- Message -----  From: Nadja Badillo  Sent: 10/5/2018   9:31 AM  To: Kevyn Cronin MD    Aide said, her b/p is running 94/60 pulse 60 pm yesterday. This morning 88/57 pulse 69. Taking Bystolic please advise - [phone].

## 2018-12-05 ENCOUNTER — OFFICE VISIT (OUTPATIENT)
Dept: FAMILY MEDICINE CLINIC | Facility: CLINIC | Age: 33
End: 2018-12-05

## 2018-12-05 VITALS
SYSTOLIC BLOOD PRESSURE: 110 MMHG | HEIGHT: 69 IN | DIASTOLIC BLOOD PRESSURE: 70 MMHG | TEMPERATURE: 98.7 F | BODY MASS INDEX: 22.22 KG/M2 | WEIGHT: 150 LBS | HEART RATE: 88 BPM

## 2018-12-05 DIAGNOSIS — N20.0 NEPHROLITHIASIS: Chronic | ICD-10-CM

## 2018-12-05 DIAGNOSIS — I83.813 VARICOSE VEINS OF BOTH LOWER EXTREMITIES WITH PAIN: Primary | ICD-10-CM

## 2018-12-05 DIAGNOSIS — R00.2 PALPITATIONS: Chronic | ICD-10-CM

## 2018-12-05 PROCEDURE — 99213 OFFICE O/P EST LOW 20 MIN: CPT | Performed by: INTERNAL MEDICINE

## 2018-12-05 RX ORDER — TAMSULOSIN HYDROCHLORIDE 0.4 MG/1
0.4 CAPSULE ORAL DAILY
COMMUNITY
Start: 2018-11-29 | End: 2019-01-15

## 2018-12-05 NOTE — PROGRESS NOTES
"Subjective        History of Present Illness     Aide Jenkins is a 33 y.o. female who comes in today for evaluation of a large painful varicosity in the right mid-thigh extending down past the knee of the lower extremity along with some smaller superficial varicosities including a few little spider veins on left lower extremity.  Her grandmother also had varicose veins.  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  Dr. Chery, cardiologist, recommended she wear compression stockings, but she has been reluctant due to the fact she would have to wear thigh high hose.      She was seen at a walk in clinic a few nights ago for back pain, which was diagnosed as nephrolithiasis demonstrated on CT.     Review of Systems   Constitutional: Negative for chills, fatigue and fever.   HENT: Negative for congestion, ear pain, postnasal drip, sinus pressure and sore throat.    Respiratory: Negative for cough, shortness of breath and wheezing.    Cardiovascular: Negative for chest pain, palpitations and leg swelling.   Gastrointestinal: Negative for abdominal pain, blood in stool, constipation, diarrhea, nausea and vomiting.   Endocrine: Negative for cold intolerance, heat intolerance, polydipsia and polyuria.   Genitourinary: Negative for dysuria, frequency, hematuria and urgency.   Skin: Negative for rash.   Neurological: Negative for syncope and weakness.        Objective     Vitals:    12/05/18 0820   BP: 110/70   Pulse: 88   Temp: 98.7 °F (37.1 °C)   TempSrc: Oral   Weight: 68 kg (150 lb)   Height: 175.3 cm (69\")     Physical Exam   Constitutional: She is oriented to person, place, and time. She appears well-developed and well-nourished. No distress.   HENT:   Head: Normocephalic and atraumatic.   Nose: Nose normal.   Mouth/Throat: Oropharynx is clear and moist. No oropharyngeal exudate.   Eyes: EOM are normal. Pupils are equal, round, and reactive to light.   Neck: Neck supple. No JVD present. " No thyromegaly present.   Cardiovascular: Normal rate, regular rhythm and normal heart sounds.   Pulmonary/Chest: Effort normal and breath sounds normal. No accessory muscle usage. No respiratory distress. She has no wheezes. She has no rales.   Abdominal: Soft. Bowel sounds are normal. She exhibits no distension. There is no tenderness.   Musculoskeletal: She exhibits no edema.   Lymphadenopathy:     She has no cervical adenopathy.   Neurological: She is alert and oriented to person, place, and time. No cranial nerve deficit.   Skin:   A large painful varicosity in the right mid-thigh extending down past the knee of the lower extremity along with some smaller superficial varicosities including a few small spider veins on left lower extremity.   Psychiatric: She has a normal mood and affect. Her speech is normal and behavior is normal. Judgment and thought content normal.       Assessment/Plan       Refer to Alex Gross, vascular surgeon, for evaluation/repair of the varicosities of lower extremities.  Recommended she wear thigh-high compression stocking in the interim.      We discussed prevention and management of kidney stones.  Encouraged excellent hydration.  Her  has episodic kidney stones, so she is familiar with how to deal with this.  They gave her some Flomax to help with passage of a kidney stone.  She denies significant flank pain today.    She continues to experience palpitations primarily due to PVCs.  See prior notes.  She has not been able to tolerate any when necessary beta blockers, as her blood pressure is usually low normal without any blood pressure medication.  I had suggested a referral to a [place] electrophysiologist.  She will let us know if/when she is ready for that referral.  Currently she is just putting up with the symptoms.  Continue caffeine reduction and relaxation for stress reduction.    Scribed for Dr. Cronin by Rosa Suarez [facility].     Diagnoses and all orders  for this visit:    Varicose veins of both lower extremities with pain  -     Ambulatory Referral to General Surgery    Palpitations    Other orders  -     tamsulosin (FLOMAX) 0.4 MG capsule 24 hr capsule; Take 0.4 mg by mouth Daily.        No visits with results within 3 Week(s) from this visit.   Latest known visit with results is:   Admission on 10/05/2018, Discharged on 10/05/2018   Component Date Value Ref Range Status   • Glucose 10/05/2018 98  60 - 100 mg/dL Final   • BUN 10/05/2018 13  7 - 21 mg/dL Final   • Creatinine 10/05/2018 0.85  0.50 - 1.00 mg/dL Final   • Sodium 10/05/2018 138  137 - 145 mmol/L Final   • Potassium 10/05/2018 3.7  3.5 - 5.1 mmol/L Final   • Chloride 10/05/2018 100  95 - 110 mmol/L Final   • CO2 10/05/2018 27.0  22.0 - 31.0 mmol/L Final   • Calcium 10/05/2018 8.9  8.4 - 10.2 mg/dL Final   • Total Protein 10/05/2018 7.2  6.3 - 8.6 g/dL Final   • Albumin 10/05/2018 4.20  3.40 - 4.80 g/dL Final   • ALT (SGPT) 10/05/2018 20  9 - 52 U/L Final   • AST (SGOT) 10/05/2018 18  14 - 36 U/L Final   • Alkaline Phosphatase 10/05/2018 42  38 - 126 U/L Final   • Total Bilirubin 10/05/2018 0.4  0.2 - 1.3 mg/dL Final   • eGFR Non African Amer 10/05/2018 77  64 - 149 mL/min/1.73 Final   • Globulin 10/05/2018 3.0  2.3 - 3.5 gm/dL Final   • A/G Ratio 10/05/2018 1.4  1.1 - 1.8 g/dL Final   • BUN/Creatinine Ratio 10/05/2018 15.3  7.0 - 25.0 Final   • Anion Gap 10/05/2018 11.0  5.0 - 15.0 mmol/L Final   • Protime 10/05/2018 13.4  11.1 - 15.3 Seconds Final   • INR 10/05/2018 1.04  0.80 - 1.20 Final   • HCG Qualitative 10/05/2018 Negative  Negative Final   • D-Dimer, Quantitative 10/05/2018 <270  0 - 470 ng/mL (FEU) Final   • proBNP 10/05/2018 82.8  0.0 - 450.0 pg/mL Final   • Troponin I 10/05/2018 <0.012  <=0.034 ng/mL Final   • Magnesium 10/05/2018 1.8  1.6 - 2.3 mg/dL Final   • Creatine Kinase 10/05/2018 61  30 - 135 U/L Final   • WBC 10/05/2018 5.24  3.20 - 9.80 10*3/mm3 Final   • RBC 10/05/2018 4.12  3.77 -  5.16 10*6/mm3 Final   • Hemoglobin 10/05/2018 12.2  12.0 - 15.5 g/dL Final   • Hematocrit 10/05/2018 35.1  35.0 - 45.0 % Final   • MCV 10/05/2018 85.2  80.0 - 98.0 fL Final   • MCH 10/05/2018 29.6  26.5 - 34.0 pg Final   • MCHC 10/05/2018 34.8  31.4 - 36.0 g/dL Final   • RDW 10/05/2018 12.7  11.5 - 14.5 % Final   • RDW-SD 10/05/2018 39.2  36.4 - 46.3 fl Final   • MPV 10/05/2018 9.9  8.0 - 12.0 fL Final   • Platelets 10/05/2018 156  150 - 450 10*3/mm3 Final   • Neutrophil % 10/05/2018 58.3  37.0 - 80.0 % Final   • Lymphocyte % 10/05/2018 32.8  10.0 - 50.0 % Final   • Monocyte % 10/05/2018 7.1  0.0 - 12.0 % Final   • Eosinophil % 10/05/2018 1.0  0.0 - 7.0 % Final   • Basophil % 10/05/2018 0.6  0.0 - 2.0 % Final   • Immature Grans % 10/05/2018 0.2  0.0 - 0.5 % Final   • Neutrophils, Absolute 10/05/2018 3.06  2.00 - 8.60 10*3/mm3 Final   • Lymphocytes, Absolute 10/05/2018 1.72  0.60 - 4.20 10*3/mm3 Final   • Monocytes, Absolute 10/05/2018 0.37  0.00 - 0.90 10*3/mm3 Final   • Eosinophils, Absolute 10/05/2018 0.05  0.00 - 0.70 10*3/mm3 Final   • Basophils, Absolute 10/05/2018 0.03  0.00 - 0.20 10*3/mm3 Final   • Immature Grans, Absolute 10/05/2018 0.01  0.00 - 0.02 10*3/mm3 Final   ]

## 2019-01-15 ENCOUNTER — OFFICE VISIT (OUTPATIENT)
Dept: FAMILY MEDICINE CLINIC | Facility: CLINIC | Age: 34
End: 2019-01-15

## 2019-01-15 VITALS
SYSTOLIC BLOOD PRESSURE: 110 MMHG | DIASTOLIC BLOOD PRESSURE: 62 MMHG | WEIGHT: 150 LBS | HEIGHT: 69 IN | BODY MASS INDEX: 22.22 KG/M2

## 2019-01-15 DIAGNOSIS — R10.2 PELVIC PAIN IN FEMALE: Primary | ICD-10-CM

## 2019-01-15 DIAGNOSIS — R19.7 DIARRHEA, UNSPECIFIED TYPE: ICD-10-CM

## 2019-01-15 PROCEDURE — 99213 OFFICE O/P EST LOW 20 MIN: CPT | Performed by: FAMILY MEDICINE

## 2019-01-15 NOTE — PROGRESS NOTES
Subjective   Aide Jenkins is a 33 y.o. female who presents to the office for concern about some abdominal pain issue she's been having.  She started out a couple months ago with some hematuria and what was thought to be a urinary tract infection.  She was treated with some antibiotics.  She developed back pain that went around the left side and in her pelvis and diarrhea for over a week.  The diarrhea seems to have resolved but the back pain has persisted although it has lessened.  She had a CT scan which was negative for stone.  She had initially been told that she may have a stone based on the hematuria but she's never seen one past.  She has a history of some ovarian cysts but none were noted on the CT scan done recently through the urgent care.  She's had a recent colonoscopy which was negative.  She does have a Mirena.    She knows that she has been having bouts of diarrhea off and on, worse under stress.    History of Present Illness     The following portions of the patient's history were reviewed and updated as appropriate: allergies, current medications, past family history, past medical history, past social history, past surgical history and problem list.    Review of Systems   Constitutional: Negative.    HENT: Negative.    Respiratory: Negative.  Negative for shortness of breath.    Cardiovascular: Negative.  Negative for chest pain.   Gastrointestinal: Positive for abdominal pain and diarrhea.   Genitourinary: Positive for pelvic pain.   Musculoskeletal: Negative.  Negative for myalgias.   Skin: Negative.    Allergic/Immunologic: Negative for immunocompromised state.   Neurological: Negative for dizziness, tremors, seizures, syncope, weakness and numbness.   Hematological: Negative.    Psychiatric/Behavioral: Negative for agitation, confusion, dysphoric mood and sleep disturbance. The patient is not nervous/anxious.    All other systems reviewed and are negative.      Objective   Physical Exam    Constitutional: She is oriented to person, place, and time. She appears well-developed and well-nourished.   HENT:   Head: Normocephalic and atraumatic.   Nose: Nose normal.   Mouth/Throat: Oropharynx is clear and moist.   Eyes: Conjunctivae and EOM are normal. Pupils are equal, round, and reactive to light.   Neck: Normal range of motion. Neck supple. No JVD present. No tracheal deviation present. No thyromegaly present.   Cardiovascular: Normal rate, regular rhythm, normal heart sounds and intact distal pulses.   No murmur heard.  Pulmonary/Chest: Effort normal and breath sounds normal. She has no wheezes.   Abdominal: Soft. Bowel sounds are normal. She exhibits no distension. There is no tenderness.   Mildly tender in the left lower quadrant and suprapubic areas to deep palpation   Musculoskeletal: Normal range of motion. She exhibits no edema.   Lymphadenopathy:     She has no cervical adenopathy.   Neurological: She is alert and oriented to person, place, and time. Coordination normal.   Skin: Skin is warm and dry. No rash noted.   Psychiatric: She has a normal mood and affect.   Nursing note and vitals reviewed.      Assessment/Plan   Aide was seen today for abdominal pain.    Diagnoses and all orders for this visit:    Pelvic pain in female  -     US Pelvis Complete; Future    Diarrhea, unspecified type    Reviewed findings including her recent urinalysis and CT scan.  We'll get an ultrasound of the pelvis to further evaluate.  The diarrhea may or may not be related to the pelvic pain and could be a different process such as irritable bowel syndrome which we discussed today.  Follow up after the ultrasound.           This document has been electronically signed by Arianne Oneil MD on January 15, 2019 5:03 PM

## 2019-03-21 ENCOUNTER — OFFICE VISIT (OUTPATIENT)
Dept: FAMILY MEDICINE CLINIC | Facility: CLINIC | Age: 34
End: 2019-03-21

## 2019-03-21 VITALS
BODY MASS INDEX: 22.51 KG/M2 | SYSTOLIC BLOOD PRESSURE: 118 MMHG | HEART RATE: 90 BPM | TEMPERATURE: 98.9 F | HEIGHT: 69 IN | DIASTOLIC BLOOD PRESSURE: 64 MMHG | WEIGHT: 152 LBS | OXYGEN SATURATION: 99 %

## 2019-03-21 DIAGNOSIS — S16.1XXA CERVICAL STRAIN, ACUTE, INITIAL ENCOUNTER: Primary | ICD-10-CM

## 2019-03-21 DIAGNOSIS — M41.80 DEXTROSCOLIOSIS: Chronic | ICD-10-CM

## 2019-03-21 DIAGNOSIS — M62.830 MUSCLE SPASM OF BACK: ICD-10-CM

## 2019-03-21 DIAGNOSIS — M62.838 MUSCLE SPASMS OF NECK: ICD-10-CM

## 2019-03-21 PROCEDURE — 99214 OFFICE O/P EST MOD 30 MIN: CPT | Performed by: INTERNAL MEDICINE

## 2019-03-21 RX ORDER — TIZANIDINE 4 MG/1
TABLET ORAL
Qty: 30 TABLET | Refills: 3 | Status: SHIPPED | OUTPATIENT
Start: 2019-03-21 | End: 2019-05-10

## 2019-03-21 RX ORDER — MELOXICAM 15 MG/1
15 TABLET ORAL DAILY PRN
Qty: 30 TABLET | Refills: 3 | Status: SHIPPED | OUTPATIENT
Start: 2019-03-21 | End: 2019-05-10

## 2019-03-21 RX ORDER — CYCLOBENZAPRINE HCL 10 MG
TABLET ORAL
COMMUNITY
Start: 2019-03-17 | End: 2019-03-21 | Stop reason: ALTCHOICE

## 2019-03-21 NOTE — PROGRESS NOTES
Subjective        History of Present Illness     Aide Jenkins is a 33 y.o. female who reports an acute episode of posterior neck pain and muscle spasm Sunday after bending over to make her child's bed.  She went to the [facility] walk in clinic where she was given a steroid injection and muscle relaxers and also took Ibuprofen 800 mg t.i.d., without experiencing GI upset.  This has given her at least 75% improvement in pain.   X-ray of cervical spine revealed no degenerative changes.  X-rays of thoracic spine revealed mild dextroscoliotic curvature.  She had one prior episode a few months ago when she woke with neck pain lasting several days and then resolved.  She is concerned due to her mother's diagnosis of rheumatoid arthritis.  She is given assurance today her symptoms are musculoskeletal.     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  I recommended that she sit at a stool episodically to take pressure off of the low back.  Her employer will fax a  form for us to complete to allow her to use a stool due to extended standing with her employment.            Review of Systems   Constitutional: Negative for chills, fatigue and fever.   HENT: Negative for congestion, ear pain, postnasal drip, sinus pressure and sore throat.    Respiratory: Negative for cough, shortness of breath and wheezing.    Cardiovascular: Negative for chest pain, palpitations and leg swelling.   Gastrointestinal: Negative for abdominal pain, blood in stool, constipation, diarrhea, nausea and vomiting.   Endocrine: Negative for cold intolerance, heat intolerance, polydipsia and polyuria.   Genitourinary: Negative for dysuria, frequency, hematuria and urgency.   Musculoskeletal: Positive for back pain and neck pain.   Skin: Negative for rash.   Neurological: Negative for syncope and weakness.        Objective     Vitals:    03/21/19 0930   BP: 118/64   BP  "Location: Left arm   Patient Position: Sitting   Cuff Size: Adult   Pulse: 90   Temp: 98.9 °F (37.2 °C)   TempSrc: Temporal   SpO2: 99%   Weight: 68.9 kg (152 lb)   Height: 175.3 cm (69\")     Physical Exam   Constitutional: She is oriented to person, place, and time. She appears well-developed and well-nourished. No distress.   HENT:   Head: Normocephalic and atraumatic.   Nose: Nose normal.   Mouth/Throat: Oropharynx is clear and moist. No oropharyngeal exudate.   Eyes: EOM are normal. Pupils are equal, round, and reactive to light.   Neck: Neck supple. No JVD present. No thyromegaly present.   Cardiovascular: Normal rate, regular rhythm and normal heart sounds.   Pulmonary/Chest: Effort normal and breath sounds normal. No accessory muscle usage. No respiratory distress. She has no wheezes. She has no rales.   Abdominal: Soft. Bowel sounds are normal. She exhibits no distension. There is no tenderness.   Musculoskeletal: She exhibits no edema.   Exam of cervical spine reveals posterior muscle and tendon tenderness, inflammation and increased trapezius and paraspinal muscle spasm and tension.  An isolated enlarged lymph node is noted just below the occipital prominence on the right.  There is muscle spasm and tenderness noted just medial to the right scapula.    Exam of lumbar spine reveals mild inflammation of bilateral SI joints and tenderness with increased tension of the lumbar paraspinal muscles.         Lymphadenopathy:     She has no cervical adenopathy.   Neurological: She is alert and oriented to person, place, and time. No cranial nerve deficit.   Psychiatric: She has a normal mood and affect. Her speech is normal and behavior is normal. Judgment and thought content normal.       Assessment/Plan      For the cervical strain, a prescription is sent for Mobic 15 mg to take on tablet q.d. as needed and Zanaflex 4 mg to take 1/2 to 1 tablet q.h.s. p.r.n. muscle spasms.  I demonstrated deep massage technique to " help relieve muscle spasm.  Also recommended warm moist heat for symptomatic relief.    I demonstrated some home physical therapy exercises to help with the neck and thoracic issues.    The Mobic and Zanaflex should be helpful with episodes of low back pain as well, which I feel is mostly aggravated by long hours of standing in her job as a pharmacist.  I recommend sitting at a stool episodically.  She is going to have her employer send a form to complete to allow her to use a stool due to her episodic low back pain.  Being able to sit at his stool episodically will change her work position and should also help her issues with cervical and thoracic pain.       Return p.r.n.     Scribed for Dr. Cronin by Rosa Suarez [facility].     Diagnoses and all orders for this visit:    Cervical strain, acute, initial encounter    Muscle spasms of neck    Muscle spasm of back    Dextroscoliosis -thoracic, mild    Other orders  -     Discontinue: cyclobenzaprine (FLEXERIL) 10 MG tablet  -     meloxicam (MOBIC) 15 MG tablet; Take 1 tablet by mouth Daily As Needed (pain). Take with food  -     tiZANidine (ZANAFLEX) 4 MG tablet; 1/2-1 qhs prn muscle spasms        No visits with results within 3 Week(s) from this visit.   Latest known visit with results is:   Admission on 10/05/2018, Discharged on 10/05/2018   Component Date Value Ref Range Status   • Glucose 10/05/2018 98  60 - 100 mg/dL Final   • BUN 10/05/2018 13  7 - 21 mg/dL Final   • Creatinine 10/05/2018 0.85  0.50 - 1.00 mg/dL Final   • Sodium 10/05/2018 138  137 - 145 mmol/L Final   • Potassium 10/05/2018 3.7  3.5 - 5.1 mmol/L Final   • Chloride 10/05/2018 100  95 - 110 mmol/L Final   • CO2 10/05/2018 27.0  22.0 - 31.0 mmol/L Final   • Calcium 10/05/2018 8.9  8.4 - 10.2 mg/dL Final   • Total Protein 10/05/2018 7.2  6.3 - 8.6 g/dL Final   • Albumin 10/05/2018 4.20  3.40 - 4.80 g/dL Final   • ALT (SGPT) 10/05/2018 20  9 - 52 U/L Final   • AST (SGOT) 10/05/2018 18  14 - 36 U/L  Final   • Alkaline Phosphatase 10/05/2018 42  38 - 126 U/L Final   • Total Bilirubin 10/05/2018 0.4  0.2 - 1.3 mg/dL Final   • eGFR Non African Amer 10/05/2018 77  64 - 149 mL/min/1.73 Final   • Globulin 10/05/2018 3.0  2.3 - 3.5 gm/dL Final   • A/G Ratio 10/05/2018 1.4  1.1 - 1.8 g/dL Final   • BUN/Creatinine Ratio 10/05/2018 15.3  7.0 - 25.0 Final   • Anion Gap 10/05/2018 11.0  5.0 - 15.0 mmol/L Final   • Protime 10/05/2018 13.4  11.1 - 15.3 Seconds Final   • INR 10/05/2018 1.04  0.80 - 1.20 Final   • HCG Qualitative 10/05/2018 Negative  Negative Final   • D-Dimer, Quantitative 10/05/2018 <270  0 - 470 ng/mL (FEU) Final   • proBNP 10/05/2018 82.8  0.0 - 450.0 pg/mL Final   • Troponin I 10/05/2018 <0.012  <=0.034 ng/mL Final   • Magnesium 10/05/2018 1.8  1.6 - 2.3 mg/dL Final   • Creatine Kinase 10/05/2018 61  30 - 135 U/L Final   • WBC 10/05/2018 5.24  3.20 - 9.80 10*3/mm3 Final   • RBC 10/05/2018 4.12  3.77 - 5.16 10*6/mm3 Final   • Hemoglobin 10/05/2018 12.2  12.0 - 15.5 g/dL Final   • Hematocrit 10/05/2018 35.1  35.0 - 45.0 % Final   • MCV 10/05/2018 85.2  80.0 - 98.0 fL Final   • MCH 10/05/2018 29.6  26.5 - 34.0 pg Final   • MCHC 10/05/2018 34.8  31.4 - 36.0 g/dL Final   • RDW 10/05/2018 12.7  11.5 - 14.5 % Final   • RDW-SD 10/05/2018 39.2  36.4 - 46.3 fl Final   • MPV 10/05/2018 9.9  8.0 - 12.0 fL Final   • Platelets 10/05/2018 156  150 - 450 10*3/mm3 Final   • Neutrophil % 10/05/2018 58.3  37.0 - 80.0 % Final   • Lymphocyte % 10/05/2018 32.8  10.0 - 50.0 % Final   • Monocyte % 10/05/2018 7.1  0.0 - 12.0 % Final   • Eosinophil % 10/05/2018 1.0  0.0 - 7.0 % Final   • Basophil % 10/05/2018 0.6  0.0 - 2.0 % Final   • Immature Grans % 10/05/2018 0.2  0.0 - 0.5 % Final   • Neutrophils, Absolute 10/05/2018 3.06  2.00 - 8.60 10*3/mm3 Final   • Lymphocytes, Absolute 10/05/2018 1.72  0.60 - 4.20 10*3/mm3 Final   • Monocytes, Absolute 10/05/2018 0.37  0.00 - 0.90 10*3/mm3 Final   • Eosinophils, Absolute 10/05/2018 0.05   0.00 - 0.70 10*3/mm3 Final   • Basophils, Absolute 10/05/2018 0.03  0.00 - 0.20 10*3/mm3 Final   • Immature Grans, Absolute 10/05/2018 0.01  0.00 - 0.02 10*3/mm3 Final   ]

## 2019-05-10 ENCOUNTER — PROCEDURE VISIT (OUTPATIENT)
Dept: OBSTETRICS AND GYNECOLOGY | Facility: CLINIC | Age: 34
End: 2019-05-10

## 2019-05-10 VITALS
HEART RATE: 77 BPM | BODY MASS INDEX: 22.07 KG/M2 | DIASTOLIC BLOOD PRESSURE: 60 MMHG | HEIGHT: 69 IN | WEIGHT: 149 LBS | SYSTOLIC BLOOD PRESSURE: 108 MMHG

## 2019-05-10 DIAGNOSIS — Z30.432 ENCOUNTER FOR IUD REMOVAL: Primary | ICD-10-CM

## 2019-05-10 PROCEDURE — 58301 REMOVE INTRAUTERINE DEVICE: CPT | Performed by: NURSE PRACTITIONER

## 2019-05-10 NOTE — PROGRESS NOTES
IUD Removal    Date of procedure:  5/10/2019    Risks and benefits discussed? yes  All questions answered? yes  Consents given by The patient  Written consent obtained? yes  Reason for removal: Desires pregnancy    Local anesthesia used:  no    Procedure documentation:    A speculum was placed in order to view the cervix.  A tenaculum did not need to be placed on the anterior cervical lip.  Cervical dilation did not need to be performed in order to access the string.  The IUD string was easily seen.  The string was grasped and the IUD was removed without difficulty.  The IUD did not appear to be adherent to the uterine cavity. It was removed intact.    She tolerated the procedure without any difficulty.     Post procedure instructions: Patient notified to call with heavy bleeding, fever or increasing pain.    Follow up needed: PRN    Diagnosis: Encounter for IUD removal    This note was electronically signed.    Katarina Moraes, APRN  5/10/2019

## 2019-05-14 ENCOUNTER — OFFICE VISIT (OUTPATIENT)
Dept: OBSTETRICS AND GYNECOLOGY | Facility: CLINIC | Age: 34
End: 2019-05-14

## 2019-05-14 VITALS
WEIGHT: 149.6 LBS | SYSTOLIC BLOOD PRESSURE: 100 MMHG | HEART RATE: 84 BPM | DIASTOLIC BLOOD PRESSURE: 62 MMHG | BODY MASS INDEX: 22.16 KG/M2 | HEIGHT: 69 IN

## 2019-05-14 DIAGNOSIS — Z01.419 ENCOUNTER FOR GYNECOLOGICAL EXAMINATION WITH PAPANICOLAOU SMEAR OF CERVIX: Primary | ICD-10-CM

## 2019-05-14 PROCEDURE — 88141 CYTOPATH C/V INTERPRET: CPT | Performed by: PATHOLOGY

## 2019-05-14 PROCEDURE — 88142 CYTOPATH C/V THIN LAYER: CPT | Performed by: NURSE PRACTITIONER

## 2019-05-14 PROCEDURE — 99395 PREV VISIT EST AGE 18-39: CPT | Performed by: NURSE PRACTITIONER

## 2019-05-14 NOTE — PROGRESS NOTES
Subjective   Chief Complaint   Patient presents with   • Gynecologic Exam     well woman annual visit     Aide Jenkins is a 33 y.o. year old  presenting to be seen for her annual exam.      No LMP recorded (lmp unknown). Patient had a Mirena but I removed it on 5/10.    OB History      Para Term  AB Living    2 2 2          SAB TAB Ectopic Molar Multiple Live Births                         Current birth control method: none, trying to conceive.     She is sexually active.  In the past 12 months there has not been new sexual partners.  Condoms are not typically used.  She would not like to be screened for STD's at today's exam.     Past 6 month menstrual history:    Cycle Frequency: absent   Menstrual cycle character: flow has been absent   Cycle Duration: 0 - 0   Number of heavy days of flows: 0   Dysmenorrhea: none   PMS: none   Intermenstrual bleeding present: no   Post-coital bleeding present: no     She exercises regularly: yes.  She wears her seat belt:yes.  She has concerns about domestic violence: no.  Last colonoscopy: N/A  Last DEXA: N/A  Last MMG:N/A  Last pap: 18  History of abnormal PAP: yes, ASCUS but negative HPV.    The following portions of the patient's history were reviewed and updated as appropriate:problem list, current medications, allergies, past family history, past medical history, past social history and past surgical history.    Social History    Tobacco Use      Smoking status: Never Smoker      Smokeless tobacco: Never Used    Social History     Substance and Sexual Activity   Alcohol Use No      Review of Systems   Constitutional: Negative.  Negative for chills and fever.   Respiratory: Negative.    Cardiovascular: Negative.    Gastrointestinal: Negative.  Negative for abdominal pain, constipation, diarrhea, nausea and vomiting.   Endocrine: Negative.    Genitourinary: Negative for dysuria, frequency, pelvic pain, vaginal bleeding and vaginal discharge.  "       Hx of HPV genital warts in college, no current s/s   Skin: Negative.    Neurological: Negative for dizziness, syncope, light-headedness and headaches.   Psychiatric/Behavioral: Negative for suicidal ideas. The patient is not nervous/anxious.          Objective   /62   Pulse 84   Ht 175.3 cm (69\")   Wt 67.9 kg (149 lb 9.6 oz)   LMP  (LMP Unknown) Comment: no period since mirena removed on 05/10/2019   Breastfeeding? No   BMI 22.09 kg/m²     General:  well developed; well nourished  no acute distress   Skin:  No suspicious lesions seen   Thyroid: normal to inspection and palpation   Breasts:  Examined in supine position  Symmetric without masses or skin dimpling  Nipples normal without inversion, lesions or discharge  There are no palpable axillary nodes  Fibrocystic changes are present both breasts without a discrete mass   Abdomen: soft, non-tender; no masses  no umbilical or inginual hernias are present  no hepato-splenomegaly   Cardiac: Heart sounds are normal.  Regular rate and rhythm without murmur, gallop or rub.   Resp: Normal expansion.  Clear to auscultation.  No rales, rhonchi, or wheezing.   Psych: alert,oriented, in NAD with a full range of affect, normal behavior and no psychotic features   Pelvis: Uterus:  Clinical staff was present for exam  External genitalia:  normal appearance of the external genitalia including Bartholin's and Ahmeek's glands.  :  urethral meatus normal;  Vaginal:  normal pink mucosa without prolapse or lesions  Cervix:  normal appearance.  Uterus:  normal size, shape and consistency  Adnexa:  normal bimanual exam of the adnexa.  Rectal:  digital rectal exam not performed; anus visually normal appearing.     Lab Review   No data reviewed    Imaging  No data reviewed       Diagnoses and all orders for this visit:    Encounter for gynecological examination with Papanicolaou smear of cervix  -     Liquid-based Pap Smear, Screening    Pap results: I will send card " in mail or call if abnormal. RTC annually for well woman exams. Monitor for menses return, if no period within 35 days, take a pregnancy test.       This note was electronically signed.    Katarina Moraes, APRN  May 14, 2019

## 2019-05-22 LAB
GEN CATEG CVX/VAG CYTO-IMP: NORMAL
LAB AP CASE REPORT: NORMAL
LAB AP GYN ADDITIONAL INFORMATION: NORMAL
LAB AP GYN OTHER FINDINGS: NORMAL
PATH INTERP SPEC-IMP: NORMAL
STAT OF ADQ CVX/VAG CYTO-IMP: NORMAL

## 2019-05-22 RX ORDER — FLUCONAZOLE 150 MG/1
150 TABLET ORAL DAILY
Qty: 2 TABLET | Refills: 0 | Status: SHIPPED | OUTPATIENT
Start: 2019-05-22 | End: 2019-07-19

## 2019-06-10 DIAGNOSIS — N91.2 AMENORRHEA: Primary | ICD-10-CM

## 2019-06-11 ENCOUNTER — LAB (OUTPATIENT)
Dept: LAB | Facility: OTHER | Age: 34
End: 2019-06-11

## 2019-06-11 DIAGNOSIS — Z32.00 ENCOUNTER FOR PREGNANCY TEST, RESULT UNKNOWN: Primary | ICD-10-CM

## 2019-06-11 DIAGNOSIS — Z32.00 ENCOUNTER FOR PREGNANCY TEST, RESULT UNKNOWN: ICD-10-CM

## 2019-06-11 LAB — HCG SERPL QL: NEGATIVE

## 2019-06-11 PROCEDURE — 36415 COLL VENOUS BLD VENIPUNCTURE: CPT | Performed by: INTERNAL MEDICINE

## 2019-06-11 PROCEDURE — 84703 CHORIONIC GONADOTROPIN ASSAY: CPT | Performed by: INTERNAL MEDICINE

## 2019-06-25 RX ORDER — SULFAMETHOXAZOLE AND TRIMETHOPRIM 800; 160 MG/1; MG/1
1 TABLET ORAL 2 TIMES DAILY
Qty: 20 TABLET | Refills: 0 | Status: SHIPPED | OUTPATIENT
Start: 2019-06-25 | End: 2019-07-19

## 2019-06-25 RX ORDER — PHENAZOPYRIDINE HYDROCHLORIDE 200 MG/1
200 TABLET, FILM COATED ORAL 3 TIMES DAILY PRN
Qty: 12 TABLET | Refills: 0 | Status: SHIPPED | OUTPATIENT
Start: 2019-06-25 | End: 2019-07-19

## 2019-07-19 ENCOUNTER — OFFICE VISIT (OUTPATIENT)
Dept: OBSTETRICS AND GYNECOLOGY | Facility: CLINIC | Age: 34
End: 2019-07-19

## 2019-07-19 VITALS
SYSTOLIC BLOOD PRESSURE: 100 MMHG | BODY MASS INDEX: 22.36 KG/M2 | HEART RATE: 93 BPM | WEIGHT: 151 LBS | HEIGHT: 69 IN | DIASTOLIC BLOOD PRESSURE: 60 MMHG

## 2019-07-19 DIAGNOSIS — N92.6 IRREGULAR PERIODS: Primary | ICD-10-CM

## 2019-07-19 DIAGNOSIS — R53.83 FATIGUE, UNSPECIFIED TYPE: ICD-10-CM

## 2019-07-19 LAB
ALBUMIN SERPL-MCNC: 4.6 G/DL (ref 3.5–5)
ALBUMIN/GLOB SERPL: 1.4 G/DL (ref 1.1–1.8)
ALP SERPL-CCNC: 39 U/L (ref 38–126)
ALT SERPL W P-5'-P-CCNC: 9 U/L
ANION GAP SERPL CALCULATED.3IONS-SCNC: 10 MMOL/L (ref 5–15)
AST SERPL-CCNC: 20 U/L (ref 14–36)
B-HCG UR QL: NEGATIVE
BASOPHILS # BLD AUTO: 0.02 10*3/MM3 (ref 0–0.2)
BASOPHILS NFR BLD AUTO: 0.5 % (ref 0–1.5)
BILIRUB SERPL-MCNC: 0.3 MG/DL (ref 0.2–1.3)
BUN BLD-MCNC: 11 MG/DL (ref 7–23)
BUN/CREAT SERPL: 14.3 (ref 7–25)
CALCIUM SPEC-SCNC: 9.5 MG/DL (ref 8.4–10.2)
CHLORIDE SERPL-SCNC: 106 MMOL/L (ref 101–112)
CO2 SERPL-SCNC: 28 MMOL/L (ref 22–30)
CREAT BLD-MCNC: 0.77 MG/DL (ref 0.52–1.04)
DEPRECATED RDW RBC AUTO: 39.1 FL (ref 37–54)
EOSINOPHIL # BLD AUTO: 0.08 10*3/MM3 (ref 0–0.4)
EOSINOPHIL NFR BLD AUTO: 1.9 % (ref 0.3–6.2)
ERYTHROCYTE [DISTWIDTH] IN BLOOD BY AUTOMATED COUNT: 12.2 % (ref 12.3–15.4)
GFR SERPL CREATININE-BSD FRML MDRD: 86 ML/MIN/1.73 (ref 64–149)
GLOBULIN UR ELPH-MCNC: 3.2 GM/DL (ref 2.3–3.5)
GLUCOSE BLD-MCNC: 99 MG/DL (ref 70–99)
HCT VFR BLD AUTO: 35.7 % (ref 34–46.6)
HGB BLD-MCNC: 12.3 G/DL (ref 12–15.9)
INTERNAL NEGATIVE CONTROL: NEGATIVE
INTERNAL POSITIVE CONTROL: POSITIVE
LYMPHOCYTES # BLD AUTO: 1.46 10*3/MM3 (ref 0.7–3.1)
LYMPHOCYTES NFR BLD AUTO: 33.8 % (ref 19.6–45.3)
Lab: NORMAL
MCH RBC QN AUTO: 30.9 PG (ref 26.6–33)
MCHC RBC AUTO-ENTMCNC: 34.5 G/DL (ref 31.5–35.7)
MCV RBC AUTO: 89.7 FL (ref 79–97)
MONOCYTES # BLD AUTO: 0.3 10*3/MM3 (ref 0.1–0.9)
MONOCYTES NFR BLD AUTO: 6.9 % (ref 5–12)
NEUTROPHILS # BLD AUTO: 2.46 10*3/MM3 (ref 1.7–7)
NEUTROPHILS NFR BLD AUTO: 56.9 % (ref 42.7–76)
PLATELET # BLD AUTO: 176 10*3/MM3 (ref 140–450)
PMV BLD AUTO: 9.6 FL (ref 6–12)
POTASSIUM BLD-SCNC: 4.1 MMOL/L (ref 3.4–5)
PROT SERPL-MCNC: 7.8 G/DL (ref 6.3–8.6)
RBC # BLD AUTO: 3.98 10*6/MM3 (ref 3.77–5.28)
SODIUM BLD-SCNC: 144 MMOL/L (ref 137–145)
WBC NRBC COR # BLD: 4.32 10*3/MM3 (ref 3.4–10.8)

## 2019-07-19 PROCEDURE — 99213 OFFICE O/P EST LOW 20 MIN: CPT | Performed by: NURSE PRACTITIONER

## 2019-07-19 PROCEDURE — 84443 ASSAY THYROID STIM HORMONE: CPT | Performed by: NURSE PRACTITIONER

## 2019-07-19 PROCEDURE — 80053 COMPREHEN METABOLIC PANEL: CPT | Performed by: NURSE PRACTITIONER

## 2019-07-19 PROCEDURE — 36415 COLL VENOUS BLD VENIPUNCTURE: CPT | Performed by: NURSE PRACTITIONER

## 2019-07-19 PROCEDURE — 85025 COMPLETE CBC W/AUTO DIFF WBC: CPT | Performed by: NURSE PRACTITIONER

## 2019-07-19 PROCEDURE — 84702 CHORIONIC GONADOTROPIN TEST: CPT | Performed by: NURSE PRACTITIONER

## 2019-07-19 PROCEDURE — 81025 URINE PREGNANCY TEST: CPT | Performed by: NURSE PRACTITIONER

## 2019-07-20 LAB
HCG INTACT+B SERPL-ACNC: <0.5 MIU/ML
TSH SERPL DL<=0.05 MIU/L-ACNC: 1.35 MIU/ML (ref 0.27–4.2)

## 2019-08-09 DIAGNOSIS — N64.52 BILATERAL NIPPLE DISCHARGE: Primary | ICD-10-CM

## 2019-08-09 PROCEDURE — 84146 ASSAY OF PROLACTIN: CPT | Performed by: NURSE PRACTITIONER

## 2019-08-09 PROCEDURE — 36415 COLL VENOUS BLD VENIPUNCTURE: CPT | Performed by: NURSE PRACTITIONER

## 2019-08-09 NOTE — PROGRESS NOTES
Pt called a week ago stating she noticed clear/white nipple discharge bilaterally. She noticed getting out of the shower, not specifically with manipulation, and not on her bra. She has never had this before. She is TTC. I instructed pt to wait until today to see if her period started or if she was pregnant. Pt started her period today and is still noticing some discharge. Will get prolactin level. Recent TSH a month ago was normal.

## 2019-08-10 LAB — PROLACTIN SERPL-MCNC: 15.2 NG/ML (ref 4.79–23.3)

## 2019-08-12 DIAGNOSIS — N64.52 BILATERAL NIPPLE DISCHARGE: Primary | ICD-10-CM

## 2019-08-16 DIAGNOSIS — N64.52 BILATERAL NIPPLE DISCHARGE: ICD-10-CM

## 2021-08-24 ENCOUNTER — LAB (OUTPATIENT)
Dept: LAB | Facility: OTHER | Age: 36
End: 2021-08-24

## 2021-08-24 PROCEDURE — 87635 SARS-COV-2 COVID-19 AMP PRB: CPT | Performed by: PHYSICIAN ASSISTANT

## 2021-09-21 ENCOUNTER — LAB (OUTPATIENT)
Dept: LAB | Facility: OTHER | Age: 36
End: 2021-09-21

## 2021-09-21 ENCOUNTER — OFFICE VISIT (OUTPATIENT)
Dept: FAMILY MEDICINE CLINIC | Facility: CLINIC | Age: 36
End: 2021-09-21

## 2021-09-21 VITALS
HEIGHT: 69 IN | WEIGHT: 144.8 LBS | TEMPERATURE: 97 F | SYSTOLIC BLOOD PRESSURE: 106 MMHG | HEART RATE: 72 BPM | DIASTOLIC BLOOD PRESSURE: 70 MMHG | BODY MASS INDEX: 21.45 KG/M2

## 2021-09-21 DIAGNOSIS — M53.3 SACRO-ILIAC PAIN: ICD-10-CM

## 2021-09-21 DIAGNOSIS — M46.92 INFLAMMATORY SPONDYLOPATHY OF CERVICAL REGION (HCC): ICD-10-CM

## 2021-09-21 DIAGNOSIS — M62.830 MUSCLE SPASM OF BACK: ICD-10-CM

## 2021-09-21 DIAGNOSIS — M62.838 MUSCLE SPASMS OF NECK: Primary | ICD-10-CM

## 2021-09-21 PROBLEM — O26.899 RH NEGATIVE STATE IN ANTEPARTUM PERIOD: Status: ACTIVE | Noted: 2020-07-28

## 2021-09-21 PROBLEM — N80.30 ENDOMETRIOSIS OF PELVIC PERITONEUM: Status: ACTIVE | Noted: 2019-12-13

## 2021-09-21 PROBLEM — Z67.91 RH NEGATIVE STATE IN ANTEPARTUM PERIOD: Status: ACTIVE | Noted: 2020-07-28

## 2021-09-21 PROBLEM — N93.9 ABNORMAL UTERINE BLEEDING (AUB): Status: ACTIVE | Noted: 2020-12-23

## 2021-09-21 LAB
ALBUMIN SERPL-MCNC: 4.9 G/DL (ref 3.5–5)
ALBUMIN/GLOB SERPL: 1.5 G/DL (ref 1.1–1.8)
ALP SERPL-CCNC: 52 U/L (ref 38–126)
ALT SERPL W P-5'-P-CCNC: 11 U/L
ANION GAP SERPL CALCULATED.3IONS-SCNC: 9 MMOL/L (ref 5–15)
AST SERPL-CCNC: 20 U/L (ref 14–36)
BASOPHILS # BLD AUTO: 0.04 10*3/MM3 (ref 0–0.2)
BASOPHILS NFR BLD AUTO: 0.9 % (ref 0–1.5)
BILIRUB SERPL-MCNC: 0.7 MG/DL (ref 0.2–1.3)
BUN SERPL-MCNC: 13 MG/DL (ref 7–23)
BUN/CREAT SERPL: 14.3 (ref 7–25)
CALCIUM SPEC-SCNC: 9.6 MG/DL (ref 8.4–10.2)
CHLORIDE SERPL-SCNC: 104 MMOL/L (ref 101–112)
CO2 SERPL-SCNC: 27 MMOL/L (ref 22–30)
CREAT SERPL-MCNC: 0.91 MG/DL (ref 0.52–1.04)
DEPRECATED RDW RBC AUTO: 40.9 FL (ref 37–54)
EOSINOPHIL # BLD AUTO: 0.04 10*3/MM3 (ref 0–0.4)
EOSINOPHIL NFR BLD AUTO: 0.9 % (ref 0.3–6.2)
ERYTHROCYTE [DISTWIDTH] IN BLOOD BY AUTOMATED COUNT: 13.1 % (ref 12.3–15.4)
ERYTHROCYTE [SEDIMENTATION RATE] IN BLOOD: 3 MM/HR (ref 0–20)
GFR SERPL CREATININE-BSD FRML MDRD: 70 ML/MIN/1.73 (ref 64–149)
GLOBULIN UR ELPH-MCNC: 3.3 GM/DL (ref 2.3–3.5)
GLUCOSE SERPL-MCNC: 101 MG/DL (ref 70–99)
HCT VFR BLD AUTO: 38.5 % (ref 34–46.6)
HGB BLD-MCNC: 13.1 G/DL (ref 12–15.9)
LYMPHOCYTES # BLD AUTO: 1.23 10*3/MM3 (ref 0.7–3.1)
LYMPHOCYTES NFR BLD AUTO: 29 % (ref 19.6–45.3)
MCH RBC QN AUTO: 29.8 PG (ref 26.6–33)
MCHC RBC AUTO-ENTMCNC: 34 G/DL (ref 31.5–35.7)
MCV RBC AUTO: 87.7 FL (ref 79–97)
MONOCYTES # BLD AUTO: 0.33 10*3/MM3 (ref 0.1–0.9)
MONOCYTES NFR BLD AUTO: 7.8 % (ref 5–12)
NEUTROPHILS NFR BLD AUTO: 2.6 10*3/MM3 (ref 1.7–7)
NEUTROPHILS NFR BLD AUTO: 61.4 % (ref 42.7–76)
PLATELET # BLD AUTO: 203 10*3/MM3 (ref 140–450)
PMV BLD AUTO: 9.6 FL (ref 6–12)
POTASSIUM SERPL-SCNC: 4.3 MMOL/L (ref 3.4–5)
PROT SERPL-MCNC: 8.2 G/DL (ref 6.3–8.6)
RBC # BLD AUTO: 4.39 10*6/MM3 (ref 3.77–5.28)
RHEUMATOID FACT SERPL-ACNC: NEGATIVE [IU]/ML
SODIUM SERPL-SCNC: 140 MMOL/L (ref 137–145)
WBC # BLD AUTO: 4.24 10*3/MM3 (ref 3.4–10.8)

## 2021-09-21 PROCEDURE — 85025 COMPLETE CBC W/AUTO DIFF WBC: CPT | Performed by: INTERNAL MEDICINE

## 2021-09-21 PROCEDURE — 80053 COMPREHEN METABOLIC PANEL: CPT | Performed by: INTERNAL MEDICINE

## 2021-09-21 PROCEDURE — 86038 ANTINUCLEAR ANTIBODIES: CPT | Performed by: INTERNAL MEDICINE

## 2021-09-21 PROCEDURE — 86140 C-REACTIVE PROTEIN: CPT | Performed by: INTERNAL MEDICINE

## 2021-09-21 PROCEDURE — 99214 OFFICE O/P EST MOD 30 MIN: CPT | Performed by: INTERNAL MEDICINE

## 2021-09-21 PROCEDURE — 86200 CCP ANTIBODY: CPT | Performed by: INTERNAL MEDICINE

## 2021-09-21 PROCEDURE — 86430 RHEUMATOID FACTOR TEST QUAL: CPT | Performed by: INTERNAL MEDICINE

## 2021-09-21 PROCEDURE — 82550 ASSAY OF CK (CPK): CPT | Performed by: INTERNAL MEDICINE

## 2021-09-21 PROCEDURE — 36415 COLL VENOUS BLD VENIPUNCTURE: CPT | Performed by: INTERNAL MEDICINE

## 2021-09-21 PROCEDURE — 85651 RBC SED RATE NONAUTOMATED: CPT | Performed by: INTERNAL MEDICINE

## 2021-09-21 RX ORDER — TIZANIDINE 4 MG/1
TABLET ORAL
Qty: 30 TABLET | Refills: 5 | Status: SHIPPED | OUTPATIENT
Start: 2021-09-21 | End: 2022-04-13

## 2021-09-21 RX ORDER — MELOXICAM 15 MG/1
15 TABLET ORAL DAILY PRN
Qty: 30 TABLET | Refills: 5 | Status: SHIPPED | OUTPATIENT
Start: 2021-09-21 | End: 2022-04-13

## 2021-09-21 NOTE — PROGRESS NOTES
"Chief Complaint  Back Pain (has improved, mainly the neck . She had xrays yesterday) and Neck Pain (she went to Urgent care 09/20/21 for this and it had started the 19th. She has had this from time to time over the past year and usually last 3 days. Her mother has an autoimmune disorder and she is concerned. She was given Zanaflex in Urgent care but just makes her sleepy/drunk )    Subjective          History of Present Illness     Aide Jenkins is our 36-year-old female patient with history of mild dextroscliosis who presents to the clinic to follow up on a flare of chronic neck and acute onset of sacral back pain.  Sunday afternoon, she had sudden onset of lower lumbar pain in the sacrum area, making bending over extremely uncomfortable.  She used a heating pad through the day and night.  She has decreased range of motion of the neck due to impressive muscle spasms.  Yesterday morning, she had some improvement in the lower back pain, although, around mid-morning yesterday, she had onset of a flare of right-sided cervical/thoracic pain.  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  She was seen at  Urgent Care yesterday (09/20/20921) to address the pain.  She was given Zanaflex at Urgent Care, although, this causes patient excessive sedation.  She took 800 mg Ibuprofen twice yesterday without noticeable improvement. Lumbar x-rays revealed negative lumbar spine. Cervical x-rays revealed no acute cervical spine abnormality.  Lower lumbar/sacrum pain has improved, but she continues to have thoracic pain radiating through to the chest, painful with deep inspiration.      Her mother has rheumatoid arthritis, which is concerning to her.       Objective   Vital Signs:   /70   Pulse 72   Temp 97 °F (36.1 °C) (Tympanic)   Ht 175.3 cm (69\")   Wt 65.7 kg (144 lb 12.8 oz)   BMI 21.38 kg/m²       Physical Exam  Constitutional:       General: She is not " in acute distress.     Appearance: She is well-developed.   HENT:      Head: Normocephalic and atraumatic.      Nose: Nose normal.      Mouth/Throat:      Pharynx: No oropharyngeal exudate.   Eyes:      Pupils: Pupils are equal, round, and reactive to light.   Neck:      Thyroid: No thyromegaly.      Vascular: No JVD.   Cardiovascular:      Rate and Rhythm: Normal rate and regular rhythm.      Heart sounds: Normal heart sounds.   Pulmonary:      Effort: Pulmonary effort is normal. No accessory muscle usage or respiratory distress.      Breath sounds: Normal breath sounds. No wheezing or rales.   Abdominal:      General: Bowel sounds are normal. There is no distension.      Palpations: Abdomen is soft.      Tenderness: There is no abdominal tenderness.   Musculoskeletal:      Cervical back: Neck supple.      Comments: Vertebral tenderness and C-6,7, and 8   Limited range of motion and pain with extreme ROM.       Lymphadenopathy:      Cervical: No cervical adenopathy.   Neurological:      Mental Status: She is alert and oriented to person, place, and time.      Cranial Nerves: No cranial nerve deficit.   Psychiatric:         Speech: Speech normal.         Behavior: Behavior normal.         Thought Content: Thought content normal.         Judgment: Judgment normal.            Result Review :     Common labs    Common Labsle 9/21/21 9/21/21    1116 1116   Glucose  101 (A)   BUN  13   Creatinine  0.91   eGFR Non African Am  70   Sodium  140   Potassium  4.3   Chloride  104   Calcium  9.6   Albumin  4.90   Total Bilirubin  0.7   Alkaline Phosphatase  52   AST (SGOT)  20   ALT (SGPT)  11   WBC 4.24    Hemoglobin 13.1    Hematocrit 38.5    Platelets 203    (A) Abnormal value            Data reviewed: Radiologic studies X-rays of lumbar and cervical spine 09/20/2021          Assessment and Plan    Diagnoses and all orders for this visit:    1. Muscle spasms of neck (Primary)    2. Muscle spasm of back    3. Inflammatory  spondylopathy of cervical region (CMS/HCC)  -     JAIMIE Direct Reflex to 11 Biomarker; Future  -     Cyclic citrul peptide antibody, IgG/IgA; Future  -     CK; Future  -     C-reactive protein; Future  -     Rheumatoid factor; Future  -     Sedimentation rate; Future  -     CBC Auto Differential; Future  -     Comprehensive Metabolic Panel; Future    4. Sacro-iliac pain  -     JAIMIE Direct Reflex to 11 Biomarker; Future  -     Cyclic citrul peptide antibody, IgG/IgA; Future  -     CK; Future  -     C-reactive protein; Future  -     Rheumatoid factor; Future  -     Sedimentation rate; Future    Other orders  -     meloxicam (MOBIC) 15 MG tablet; Take 1 tablet by mouth Daily As Needed (pain). Take with food  Dispense: 30 tablet; Refill: 5  -     tiZANidine (ZANAFLEX) 4 MG tablet; 1/2-1 qhs prn muscle spasms  Dispense: 30 tablet; Refill: 5         I spent 33 minutes caring for Aide on this date of service. This time includes time spent by me in the following activities:preparing for the visit, reviewing tests, obtaining and/or reviewing a separately obtained history, performing a medically appropriate examination and/or evaluation , counseling and educating the patient/family/caregiver, ordering medications, tests, or procedures, documenting information in the medical record and independently interpreting results and communicating that information with the patient/family/caregiver     A prescription is sent for Mobic 15 mg to take one q.d. with food and Zanaflex 4 mg tablets to take 1/2 tablet.   She was prescribed capsules at Urgent Care yesterday, making it hard to take 1/2 dose.  I demonstrated home exercises to help relieve muscle spasm in low lumbar, posterior neck and cervical spine region as well as deep muscle massage to help relief cervical muscle spasm.        Due to family history of RA,  I sent lab orders for patient to complete CBC, CMP, sed rate, CK, rheumatoid arthritis, C-reactive protein,  C peptide,  JAIMIE   We will notify her with results.         [facility] Dr. Cronin [facility].     Follow Up   Return if symptoms worsen or fail to improve.  Patient was given instructions and counseling regarding her condition or for health maintenance advice. Please see specific information pulled into the AVS if appropriate.

## 2021-09-21 NOTE — PATIENT INSTRUCTIONS
Exercising to Lose Weight  Exercise is structured, repetitive physical activity to improve fitness and health. Getting regular exercise is important for everyone. It is especially important if you are overweight. Being overweight increases your risk of heart disease, stroke, diabetes, high blood pressure, and several types of cancer. Reducing your calorie intake and exercising can help you lose weight.  Exercise is usually categorized as moderate or vigorous intensity. To lose weight, most people need to do a certain amount of moderate-intensity or vigorous-intensity exercise each week.  Moderate-intensity exercise    Moderate-intensity exercise is any activity that gets you moving enough to burn at least three times more energy (calories) than if you were sitting.  Examples of moderate exercise include:  · Walking a mile in 15 minutes.  · Doing light yard work.  · Biking at an easy pace.  Most people should get at least 150 minutes (2 hours and 30 minutes) a week of moderate-intensity exercise to maintain their body weight.  Vigorous-intensity exercise  Vigorous-intensity exercise is any activity that gets you moving enough to burn at least six times more calories than if you were sitting. When you exercise at this intensity, you should be working hard enough that you are not able to carry on a conversation.  Examples of vigorous exercise include:  · Running.  · Playing a team sport, such as football, basketball, and soccer.  · Jumping rope.  Most people should get at least 75 minutes (1 hour and 15 minutes) a week of vigorous-intensity exercise to maintain their body weight.  How can exercise affect me?  When you exercise enough to burn more calories than you eat, you lose weight. Exercise also reduces body fat and builds muscle. The more muscle you have, the more calories you burn. Exercise also:  · Improves mood.  · Reduces stress and tension.  · Improves your overall fitness, flexibility, and  endurance.  · Increases bone strength.  The amount of exercise you need to lose weight depends on:  · Your age.  · The type of exercise.  · Any health conditions you have.  · Your overall physical ability.  Talk to your health care provider about how much exercise you need and what types of activities are safe for you.  What actions can I take to lose weight?  Nutrition    · Make changes to your diet as told by your health care provider or diet and nutrition specialist (dietitian). This may include:  ? Eating fewer calories.  ? Eating more protein.  ? Eating less unhealthy fats.  ? Eating a diet that includes fresh fruits and vegetables, whole grains, low-fat dairy products, and lean protein.  ? Avoiding foods with added fat, salt, and sugar.  · Drink plenty of water while you exercise to prevent dehydration or heat stroke.    Activity  · Choose an activity that you enjoy and set realistic goals. Your health care provider can help you make an exercise plan that works for you.  · Exercise at a moderate or vigorous intensity most days of the week.  ? The intensity of exercise may vary from person to person. You can tell how intense a workout is for you by paying attention to your breathing and heartbeat. Most people will notice their breathing and heartbeat get faster with more intense exercise.  · Do resistance training twice each week, such as:  ? Push-ups.  ? Sit-ups.  ? Lifting weights.  ? Using resistance bands.  · Getting short amounts of exercise can be just as helpful as long structured periods of exercise. If you have trouble finding time to exercise, try to include exercise in your daily routine.  ? Get up, stretch, and walk around every 30 minutes throughout the day.  ? Go for a walk during your lunch break.  ? Park your car farther away from your destination.  ? If you take public transportation, get off one stop early and walk the rest of the way.  ? Make phone calls while standing up and walking  around.  ? Take the stairs instead of elevators or escalators.  · Wear comfortable clothes and shoes with good support.  · Do not exercise so much that you hurt yourself, feel dizzy, or get very short of breath.  Where to find more information  · [place] Department of Health and Human Services: www.hhs.gov  · Centers for Disease Control and Prevention (CDC): www.cdc.gov  Contact a health care provider:  · Before starting a new exercise program.  · If you have questions or concerns about your weight.  · If you have a medical problem that keeps you from exercising.  Get help right away if you have any of the following while exercising:  · Injury.  · Dizziness.  · Difficulty breathing or shortness of breath that does not go away when you stop exercising.  · Chest pain.  · Rapid heartbeat.  Summary  · Being overweight increases your risk of heart disease, stroke, diabetes, high blood pressure, and several types of cancer.  · Losing weight happens when you burn more calories than you eat.  · Reducing the amount of calories you eat in addition to getting regular moderate or vigorous exercise each week helps you lose weight.  This information is not intended to replace advice given to you by your health care provider. Make sure you discuss any questions you have with your health care provider.  Document Revised: 04/15/2021 Document Reviewed: 04/15/2021  Elsevier Patient Education © 2021 [employer]

## 2021-09-22 LAB
CK SERPL-CCNC: 56 U/L (ref 20–180)
CRP SERPL-MCNC: <0.3 MG/DL (ref 0–0.5)

## 2021-09-23 LAB
ANA SER QL: NEGATIVE
CCP IGA+IGG SERPL IA-ACNC: 7 UNITS (ref 0–19)

## 2021-11-05 ENCOUNTER — OFFICE VISIT (OUTPATIENT)
Dept: OBSTETRICS AND GYNECOLOGY | Facility: CLINIC | Age: 36
End: 2021-11-05

## 2021-11-05 VITALS
SYSTOLIC BLOOD PRESSURE: 100 MMHG | HEIGHT: 69 IN | DIASTOLIC BLOOD PRESSURE: 66 MMHG | BODY MASS INDEX: 21.03 KG/M2 | WEIGHT: 142 LBS | HEART RATE: 100 BPM

## 2021-11-05 DIAGNOSIS — N64.4 BREAST PAIN, LEFT: Primary | ICD-10-CM

## 2021-11-05 DIAGNOSIS — L29.9 PRURITUS: ICD-10-CM

## 2021-11-05 PROCEDURE — 99213 OFFICE O/P EST LOW 20 MIN: CPT | Performed by: NURSE PRACTITIONER

## 2021-11-05 NOTE — PROGRESS NOTES
Subjective   Aide Jenkins is a 36 y.o. female.     History of Present Illness   Pt presents with concerns about the left breast. She notes left nipple to be itching and pain within the upper, outer quadrant of the left breast. Present x 1 month. Present most of the time. Rates 2/10. Not severe but persisting. No nipple discharge or skin changes. No concerns in the right breast. Had MMG 8/14/2019 for nipple discharge that was BIRADS 2. Pt is not on any hormones and doesn't smoke. Does drink more caffeine than usual. No family hx of breast cancer but some benign, cystic tissues removed in family members.     Pt has had bilateral salpingectomy and endometrial ablation since I last saw her. Doing well otherwise.     The following portions of the patient's history were reviewed and updated as appropriate: allergies, current medications, past family history, past medical history, past social history, past surgical history and problem list.    Review of Systems   Constitutional: Negative.  Negative for chills and fever.   Respiratory: Negative.    Cardiovascular: Negative.    Genitourinary: Negative.  Positive for breast pain (left). Negative for breast discharge and breast lump.        Left nipple itching   Skin: Negative.  Negative for color change, rash and bruise.       Objective   Physical Exam  Vitals reviewed.   Constitutional:       Appearance: Normal appearance. She is normal weight.   Cardiovascular:      Rate and Rhythm: Normal rate and regular rhythm.      Heart sounds: Normal heart sounds.   Pulmonary:      Effort: Pulmonary effort is normal.      Breath sounds: Normal breath sounds.   Chest:   Breasts:      Right: Normal. No swelling, bleeding, inverted nipple, mass, nipple discharge, skin change or tenderness.      Left: Tenderness present. No swelling, bleeding, inverted nipple, mass, nipple discharge or skin change.            Comments: No skin dimpling, rash, peau d'orange appearance. No nipple  discharge. General dense tissue in the upper outer quadrants but similar on both sides.   Skin:     General: Skin is warm and dry.      Coloration: Skin is not pale.      Findings: No bruising, erythema, lesion or rash.   Neurological:      Mental Status: She is alert and oriented to person, place, and time.   Psychiatric:         Mood and Affect: Mood normal.         Behavior: Behavior normal.           Assessment/Plan   Diagnoses and all orders for this visit:    1. Breast pain, left (Primary)  -     Mammo Diagnostic Digital Tomosynthesis Bilateral With CAD; Future  -     US Breast Left Limited; Future    2. Pruritus  Comments:  left nipple   Orders:  -     Mammo Diagnostic Digital Tomosynthesis Bilateral With CAD; Future  -     US Breast Left Limited; Future      Discussed findings on exam. Given her age, I do recommend she obtain bilateral breast imaging. Diagnostic MMG and US ordered. Scheduled 11/11/21. I will call pt with results and discuss next steps PRN. Cut out caffiene. NSAIDS PRN for pain. Can consider Vit E daily if pain is persisting.

## 2021-11-12 DIAGNOSIS — R92.8 ABNORMAL MAMMOGRAM: Primary | ICD-10-CM

## 2022-03-01 ENCOUNTER — LAB (OUTPATIENT)
Dept: LAB | Facility: OTHER | Age: 37
End: 2022-03-01

## 2022-03-01 DIAGNOSIS — R53.83 OTHER FATIGUE: Primary | ICD-10-CM

## 2022-03-01 DIAGNOSIS — R53.83 OTHER FATIGUE: ICD-10-CM

## 2022-03-01 LAB — SARS-COV-2 N GENE RESP QL NAA+PROBE: NOT DETECTED

## 2022-03-01 PROCEDURE — 87635 SARS-COV-2 COVID-19 AMP PRB: CPT | Performed by: FAMILY MEDICINE

## 2022-06-09 RX ORDER — ONDANSETRON 4 MG/1
TABLET, FILM COATED ORAL
Qty: 12 TABLET | Refills: 1 | Status: SHIPPED | OUTPATIENT
Start: 2022-06-09

## 2022-06-09 NOTE — TELEPHONE ENCOUNTER
Refill for Zofran 4 mg was requested. The prescription was discontinued 7/18/2018. I called the patient and she stated that she has been experiencing nausea since recovering from COVID recently.

## 2023-04-03 RX ORDER — TIZANIDINE 4 MG/1
TABLET ORAL
Qty: 30 TABLET | Refills: 0 | OUTPATIENT
Start: 2023-04-03

## 2023-05-16 ENCOUNTER — LAB (OUTPATIENT)
Dept: LAB | Facility: OTHER | Age: 38
End: 2023-05-16
Payer: COMMERCIAL

## 2023-05-16 ENCOUNTER — OFFICE VISIT (OUTPATIENT)
Dept: FAMILY MEDICINE CLINIC | Facility: CLINIC | Age: 38
End: 2023-05-16
Payer: COMMERCIAL

## 2023-05-16 VITALS
OXYGEN SATURATION: 99 % | SYSTOLIC BLOOD PRESSURE: 104 MMHG | TEMPERATURE: 97.2 F | HEIGHT: 69 IN | WEIGHT: 148.6 LBS | DIASTOLIC BLOOD PRESSURE: 72 MMHG | HEART RATE: 69 BPM | BODY MASS INDEX: 22.01 KG/M2

## 2023-05-16 DIAGNOSIS — I47.1 PSVT (PAROXYSMAL SUPRAVENTRICULAR TACHYCARDIA): Chronic | ICD-10-CM

## 2023-05-16 DIAGNOSIS — Z86.79 HISTORY OF VENTRICULAR FIBRILLATION: Chronic | ICD-10-CM

## 2023-05-16 DIAGNOSIS — M47.22 OSTEOARTHRITIS OF SPINE WITH RADICULOPATHY, CERVICAL REGION: Primary | Chronic | ICD-10-CM

## 2023-05-16 DIAGNOSIS — E87.1 HYPONATREMIA: ICD-10-CM

## 2023-05-16 PROBLEM — R06.02 SHORTNESS OF BREATH: Status: ACTIVE | Noted: 2022-02-01

## 2023-05-16 PROBLEM — I47.10 SVT (SUPRAVENTRICULAR TACHYCARDIA): Status: ACTIVE | Noted: 2022-03-04

## 2023-05-16 PROBLEM — I95.9 HYPOTENSION: Status: ACTIVE | Noted: 2022-02-01

## 2023-05-16 PROBLEM — R42 DIZZINESS: Status: ACTIVE | Noted: 2022-02-01

## 2023-05-16 PROBLEM — I47.10 PSVT (PAROXYSMAL SUPRAVENTRICULAR TACHYCARDIA): Chronic | Status: ACTIVE | Noted: 2022-03-04

## 2023-05-16 PROBLEM — R07.89 CHEST TIGHTNESS: Status: ACTIVE | Noted: 2022-02-01

## 2023-05-16 PROBLEM — I49.3 PVC'S (PREMATURE VENTRICULAR CONTRACTIONS): Status: ACTIVE | Noted: 2022-02-01

## 2023-05-16 LAB
ANION GAP SERPL CALCULATED.3IONS-SCNC: 9 MMOL/L (ref 5–15)
BUN SERPL-MCNC: 12 MG/DL (ref 7–23)
BUN/CREAT SERPL: 15.8 (ref 7–25)
CALCIUM SPEC-SCNC: 8.8 MG/DL (ref 8.4–10.2)
CHLORIDE SERPL-SCNC: 103 MMOL/L (ref 101–112)
CO2 SERPL-SCNC: 26 MMOL/L (ref 22–30)
CREAT SERPL-MCNC: 0.76 MG/DL (ref 0.52–1.04)
EGFRCR SERPLBLD CKD-EPI 2021: 103 ML/MIN/1.73
GLUCOSE SERPL-MCNC: 98 MG/DL (ref 70–99)
POTASSIUM SERPL-SCNC: 3.9 MMOL/L (ref 3.4–5)
SODIUM SERPL-SCNC: 138 MMOL/L (ref 137–145)

## 2023-05-16 PROCEDURE — 36415 COLL VENOUS BLD VENIPUNCTURE: CPT | Performed by: INTERNAL MEDICINE

## 2023-05-16 PROCEDURE — 80048 BASIC METABOLIC PNL TOTAL CA: CPT | Performed by: INTERNAL MEDICINE

## 2023-05-16 PROCEDURE — 99214 OFFICE O/P EST MOD 30 MIN: CPT | Performed by: INTERNAL MEDICINE

## 2023-05-16 RX ORDER — CYCLOBENZAPRINE HCL 10 MG
10 TABLET ORAL NIGHTLY PRN
Qty: 30 TABLET | Refills: 11 | Status: SHIPPED | OUTPATIENT
Start: 2023-05-16

## 2023-05-16 RX ORDER — TIZANIDINE 4 MG/1
TABLET ORAL
Qty: 30 TABLET | Refills: 11 | Status: SHIPPED | OUTPATIENT
Start: 2023-05-16

## 2023-05-16 RX ORDER — MELOXICAM 15 MG/1
15 TABLET ORAL DAILY PRN
Qty: 30 TABLET | Refills: 11 | Status: SHIPPED | OUTPATIENT
Start: 2023-05-16

## 2023-05-16 NOTE — PROGRESS NOTES
"Chief Complaint  Neck Pain (X 2 months occassionally radiates down right arm )    Subjective        History of Present Illness     Aide has a  history of mild dextroscliosis who presents to the clinic to follow up on a flare of chronic neck pain occasionally radiating to right upper extremity down to fifth finger.  She feels this flare is different that most recent flare with pain more in the upper cervical spine.  She has tenderness at C-3 vertebra on exam today. Taking Flexeril q.h.s p.r.n., Zanaflex taking 1/2 tablet q.a.m. p.r.n. and Mobic at night as well as massage have given temporary relief.  Cervical x-rays in 09/2021 revealed no acute cervical spine abnormality.     Patient is following with Dr. Leiva, cardiologist, for episodic palpitations, PSVT, history of ASD repair. She has occasions of paroxysmal ventricular and supraventricular tachycardia, but she reports also 1 brief nonsustained run of V-fib on recent Holter monitoring and was started on metoprolol.  Labs in March revealed hyponatremia with sodium surprisingly low at 126.  I repeated that today and sodium is normal.  I suspect the previous low sodium was lab error.  Dr. Leiva recommended checking cortisol levels, although, she has not yet gotten this done.           Objective   Vital Signs:  /72   Pulse 69   Temp 97.2 °F (36.2 °C)   Ht 175.3 cm (69\")   Wt 67.4 kg (148 lb 9.6 oz)   SpO2 99%   BMI 21.94 kg/m²   Estimated body mass index is 21.94 kg/m² as calculated from the following:    Height as of this encounter: 175.3 cm (69\").    Weight as of this encounter: 67.4 kg (148 lb 9.6 oz).       BMI is within normal parameters. No other follow-up for BMI required.      Physical Exam  Constitutional:       General: She is not in acute distress.     Appearance: She is well-developed.      Comments: Pleasant female.    HENT:      Head: Normocephalic and atraumatic.      Nose: Nose normal.      Mouth/Throat:      Pharynx: No " oropharyngeal exudate.   Eyes:      Pupils: Pupils are equal, round, and reactive to light.   Neck:      Thyroid: No thyromegaly.      Vascular: No JVD.   Cardiovascular:      Rate and Rhythm: Normal rate and regular rhythm.      Heart sounds: Normal heart sounds.   Pulmonary:      Effort: Pulmonary effort is normal. No accessory muscle usage or respiratory distress.      Breath sounds: Normal breath sounds. No wheezing or rales.   Abdominal:      General: Bowel sounds are normal. There is no distension.      Palpations: Abdomen is soft.      Tenderness: There is no abdominal tenderness.   Musculoskeletal:      Cervical back: Neck supple.      Comments: Exam of cervical spine tenderness at C-3 vertebra.      Lymphadenopathy:      Cervical: No cervical adenopathy.   Neurological:      Mental Status: She is alert and oriented to person, place, and time.      Cranial Nerves: No cranial nerve deficit.   Psychiatric:         Speech: Speech normal.         Behavior: Behavior normal.         Thought Content: Thought content normal.         Judgment: Judgment normal.             Result Review :    Common labs        3/21/2023    07:42 5/16/2023    14:46   Common Labs   Glucose  98     Glucose 93         BUN 14      12     Creatinine 0.8      0.76     Sodium 126      138     Potassium 4.3      3.9     Chloride 100      103     Calcium 8.9      8.8     Albumin 3.9         Total Bilirubin 0.50         Alkaline Phosphatase 60         AST (SGOT) 13         ALT (SGPT) 8         Total Cholesterol 181         Triglycerides 66         HDL Cholesterol 53         LDL Cholesterol  104             This result is from an external source.     Data reviewed: Radiologic studies Cervical x-rays  09/2021  and Consultant notes Dr. Leiva, cardiologist.     Future Appointments   Date Time Provider Department Center   5/31/2023 10:45 AM Suad Acosta DO [facility] None              Assessment and Plan   Diagnoses and all orders for this  visit:    1. Osteoarthritis of spine with radiculopathy, cervical region (Primary)  -     XR Spine Cervical Complete 4 or 5 View    2. Hyponatremia -normal today.  Recent hyponatremia likely factitious/lab error  -     Basic Metabolic Panel; Future    3. PSVT (paroxysmal supraventricular tachycardia)    4. History of ventricular fibrillation - nonsustained. Single episode caught on 30 day Holter 2023    Other orders  -     meloxicam (MOBIC) 15 MG tablet; Take 1 tablet by mouth Daily As Needed (pain). Take with food  Dispense: 30 tablet; Refill: 11  -     tiZANidine (ZANAFLEX) 4 MG tablet; 1/2-1 bid prn  Dispense: 30 tablet; Refill: 11  -     cyclobenzaprine (FLEXERIL) 10 MG tablet; Take 1 tablet by mouth At Night As Needed for Muscle Spasms.  Dispense: 30 tablet; Refill: 11                Repeat sodium today was normal at 138.  I suspect the sodium 126 last month was lab error.  I have notified Aide of this result.  I will be very surprised if cortisol levels are significantly abnormal.  Her low blood pressure issues appear to be due to the recent onset of metoprolol, which was started due to the Holter/telemetry findings.    Recommended patient schedule with Dr. Fischer, chiropractor, to address upper posterior neck pain/cervical radiculopathy.  If no progress with chiropractic manipulations, notify us and we will refer for PT.  Refills sent for Mobic, Flexeril, and Zanaflex.  X-rays of the neck today look very similar to previous C-spine films 3 to 4 years ago.  The radiologist has read them as normal.  I do not appreciate any significant change from prior x-rays.        Continue the metoprolol.   Keep follow up visits with Dr. Leiva, cardiology.  Due to that single nonsustained run of V-fib, if real, I encouraged her to have coworkers aware of this diagnosis in case she experienced a syncopal episode.     Return p.r.n.    Scribed for Dr. Cronin by Rosa Suarez [facility].     Follow Up   Return if symptoms  worsen or fail to improve.  Patient was given instructions and counseling regarding her condition or for health maintenance advice. Please see specific information pulled into the AVS if appropriate.

## 2023-05-31 ENCOUNTER — TELEPHONE (OUTPATIENT)
Dept: OBSTETRICS AND GYNECOLOGY | Facility: CLINIC | Age: 38
End: 2023-05-31

## 2023-05-31 ENCOUNTER — OFFICE VISIT (OUTPATIENT)
Dept: OBSTETRICS AND GYNECOLOGY | Facility: CLINIC | Age: 38
End: 2023-05-31
Payer: COMMERCIAL

## 2023-05-31 VITALS
SYSTOLIC BLOOD PRESSURE: 110 MMHG | HEIGHT: 69 IN | DIASTOLIC BLOOD PRESSURE: 64 MMHG | WEIGHT: 147.2 LBS | BODY MASS INDEX: 21.8 KG/M2

## 2023-05-31 DIAGNOSIS — N80.9 ENDOMETRIOSIS: ICD-10-CM

## 2023-05-31 DIAGNOSIS — N93.9 ABNORMAL UTERINE BLEEDING (AUB): Primary | ICD-10-CM

## 2023-05-31 DIAGNOSIS — R10.2 PELVIC PAIN: ICD-10-CM

## 2023-05-31 NOTE — PROGRESS NOTES
[facility]  Gynecology  Date of Service: 2023    CC: hysterectomy consult    HPI  Aide Jenkins is a 37 y.o.  premenopausal female who presents with complaints of pelvic/back cramping, irregular bleeding..      Mirena IUD placed 2016. Had stopped periods, occasional pain but had been better, but ended up having tubal and ablation  for pelvic pain. Initially no periods after ablation.     6 mos ago pain getting worse and starting to bleed again. Irregular, no cycle. Couple of times per month, hours to few days. Sometimes heavy enough for tampon, other times pantyliner. No dyspareunia, no bleeding with intercourse. Usually pain just with bleeding, sometimes cramping other times. Usually just manages, sometimes tylenol/ibuprofen.    H/o PSVT and h/o V [id number]. 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 Occ palpitations, much better on Metoprolol. Sees him 2x per year.  EMB [place] 2020 benign  2020 NIL    Denies any vaginal itching, burning, irritation, or discharge. Denies any sexual dysfunction concerns. Not reporting any urinary symptoms other than KVNG since deliveries. Leaks most days.    ROS  Review of Systems   Constitutional: Negative.    HENT: Negative.    Respiratory: Negative.    Cardiovascular: Negative.    Gastrointestinal: Negative.    Genitourinary: Positive for menstrual problem and pelvic pain.   Skin: Negative.    Psychiatric/Behavioral: Negative.        GYN HISTORY  Menarche: 9 yo  Menses: see above  History of STIs: HPV warts, removed previously, no recurrence  Last pap smear:   Last Completed Pap Smear     This patient has no relevant Health Maintenance data.        Abnormal pap smear history: ASCUS previously with yeast; repeat was normal  Contraception: tubal     OB HISTORY  OB History    Para Term  AB Living   3 3 3     3   SAB IAB Ectopic Molar Multiple Live Births             3      # Outcome Date GA Lbr Saeed/2nd Weight Sex Delivery Anes  PTL Lv   3 Term 11/10/20 39w1d / 00:33 4150 g (9 lb 2.4 oz) M Vag-Spont EPI N ALMA   2 Term 2016     Vag-Spont   ALMA   1 Term 2013     Vag-Spont   ALMA     PAST MEDICAL HISTORY  Past Medical History:   Diagnosis Date   • Allergic rhinitis    • Anxiety    • Atrial septal defect     s/p repair December 2013    • Atypical chest pain    • Chest pain    • Chronic pelvic pain in female    • Chronic tension-type headache    • Dyspnea    • Edema, lower extremity    • Endometriosis    • Fatigue    • Fibrocystic breast    • GERMAINE (generalized anxiety disorder) 04/27/2018   • Heart disease    • Infertility, female    • Irritable bowel syndrome with diarrhea 04/27/2018   • Leukorrhea     not specified as infective   • Migraine variant    • Nausea     episodic   • Nephrolithiasis 12/05/2018   • Normocytic anemia    • Palpitations    • SVT (supraventricular tachycardia) 03/2022   • V tach 03/2022     PAST SURGICAL HISTORY  Past Surgical History:   Procedure Laterality Date   • ASD REPAIR, OSTIUM PRIMUM  2013   • CHOLECYSTECTOMY     • COLONOSCOPY  2012   • ENDOMETRIAL ABLATION  2021   • INJECTION OF MEDICATION  01/29/2014    kenalog   • SALPINGECTOMY  2021   • WISDOM TOOTH EXTRACTION       [facility] jina  lsc dx x 2 for endo, tubal x1, endometrial ablation    FAMILY HISTORY  Family History   Problem Relation Age of Onset   • Hypertension Paternal Grandmother    • Coronary artery disease Paternal Grandmother    • Hypertension Maternal Grandmother    • Colon cancer Other    • Breast cancer Neg Hx    • Endometrial cancer Neg Hx    • Ovarian cancer Neg Hx    • Uterine cancer Neg Hx      SOCIAL HISTORY  Social History     Socioeconomic History   • Marital status:    Tobacco Use   • Smoking status: Never     Passive exposure: Past   • Smokeless tobacco: Never   Substance and Sexual Activity   • Alcohol use: No   • Drug use: No   • Sexual activity: Yes     Partners: Male     Birth control/protection: Tubal ligation     Comment: tubes  "removed     ALLERGIES  Allergies   Allergen Reactions   • Ceclor [Cefaclor] Rash     swelling   • Erythromycin Rash   • Iodine Rash   • Mobic [Meloxicam] Other (See Comments)     Mouth sores   • Zithromax [Azithromycin] Rash     HOME MEDICATIONS  Prior to Admission medications    Medication Sig Start Date End Date Taking? Authorizing Provider   cetirizine (zyrTEC) 10 MG tablet Take 1 tablet by mouth Daily for 10 days.  Patient taking differently: Take 1 tablet by mouth Daily As Needed. 6/10/22 5/16/23  Killian Angulo MD   cyclobenzaprine (FLEXERIL) 10 MG tablet Take 1 tablet by mouth At Night As Needed for Muscle Spasms. 23   Kevyn Cronin MD   meloxicam (MOBIC) 15 MG tablet Take 1 tablet by mouth Daily As Needed (pain). Take with food 23   Kevyn Cronin MD   metoprolol succinate XL (TOPROL-XL) 25 MG 24 hr tablet Take 1 tablet by mouth Daily. 3/4/22 5/16/23  Emergency, Nurse Chad, RN   ondansetron (ZOFRAN) 4 MG tablet TAKE 1 TABLET BY MOUTH EVERY 8 HOURS AS NEEDED FOR NAUSEA AND VOMITING 22   Kevyn Cronin MD   tiZANidine (ZANAFLEX) 4 MG tablet 1/2-1 bid prn 23   Kevyn Cronin MD     PE  /64   Ht 175.3 cm (69\")   Wt 66.8 kg (147 lb 3.2 oz)   BMI 21.74 kg/m²        General: Alert, healthy, no distress, well nourished and well developed.  Neurologic: Alert, oriented to person, place, and time.  Gait normal.  Cranial nerves II-XII grossly intact.  HEENT: Normocephalic, atraumatic.  Extraocular muscles intact.  Lungs: Normal respiratory effort.  Heart: Regular rate and rhythm.   Abdomen: Soft, non-tender, non-distended,no masses, no hepatosplenomegaly, no hernia.  Skin: No rash, no lesions.  Extremities: No cyanosis, clubbing or edema.  PELVIC EXAM:  PREETHI deferred.    IMPRESSION  Aide Jenkins is a 37 y.o.  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    PLAN  "   1. Abnormal uterine bleeding (AUB)  2. Endometriosis  3. Pelvic pain  - Discussed recommendation for US, followed by pap, EMB for evaluation of endometrium  - Discussed options, including medical (progesterone only therapy) or surgical management with hysterectomy  - Patient strongly desires hysterectomy for definitive management  - Will plan for robotic assisted total laparoscopic hysterectomy, bilateral salpingectomy, possible unilateral or bilateral oophorectomy, cystoscopy, any other indicated procedure  - Discussed risks/benefits of surgery: risk of infection, bleeding, damage to surrounding structures,  need for additional procedures; discussed risk of anesthesia including heart attack, stroke, and death; plan for outpatient surgery discussed.  - Will have increased risk of injury to bowel/bladder/blood vessels/ureters, increased need for oophorectomy with h/o endometriosis; robot will help to decrease risk, but could require laparotomy which would necessitate 1-2 days in hospital typically  - With h/o SVT and V fib unsustained will have patient followup with Dr. Leiva for clearance-surgery could be prolonged with h/o endometriosis pending scarring, will require steep trendelenberg positioning  - Discussed ultimately pending findings during workup/evaluation with US and EMB, recommendations per cardiology, plan for surgery/recommendations could change but will tentatively schedule  - Case Request; Standing  - CBC and Differential; Future  - Basic Metabolic Panel; Future  - Type & Screen; Future  - sodium chloride 0.9 % flush 3 mL  - sodium chloride 0.9 % flush 10 mL  - sodium chloride 0.9 % infusion 40 mL  - lactated ringers infusion  - clindamycin (CLEOCIN) 900 mg in dextrose (D5W) 5 % 100 mL IVPB  - gentamicin (GARAMYCIN) 330 mg in sodium chloride 0.9 % IVPB  - Case Request      This document has been electronically signed by Suad Acosta DO on Verónica 15, 2023 12:46 CDT

## 2023-05-31 NOTE — TELEPHONE ENCOUNTER
Patient checked out with me today and said Dr Acosta told her  to talk to us  about going ahead and schedule her surgery where she can already  have a spot and be on the book.. because she's so booked up.Told her we don't do that but I would relay message to her nurse???????

## 2023-05-31 NOTE — TELEPHONE ENCOUNTER
Notified the patient we have her penciled in for 11/2/23. Patient agreed and voiced an understanding to this surgery date. Patient was encouraged to call with any further questions or concerns.

## 2023-06-01 RX ORDER — SODIUM CHLORIDE 9 MG/ML
40 INJECTION, SOLUTION INTRAVENOUS AS NEEDED
OUTPATIENT
Start: 2023-06-01

## 2023-06-01 RX ORDER — SODIUM CHLORIDE 0.9 % (FLUSH) 0.9 %
3 SYRINGE (ML) INJECTION EVERY 12 HOURS SCHEDULED
OUTPATIENT
Start: 2023-06-01

## 2023-06-01 RX ORDER — SODIUM CHLORIDE 0.9 % (FLUSH) 0.9 %
10 SYRINGE (ML) INJECTION AS NEEDED
OUTPATIENT
Start: 2023-06-01

## 2023-06-01 RX ORDER — SODIUM CHLORIDE, SODIUM LACTATE, POTASSIUM CHLORIDE, CALCIUM CHLORIDE 600; 310; 30; 20 MG/100ML; MG/100ML; MG/100ML; MG/100ML
125 INJECTION, SOLUTION INTRAVENOUS CONTINUOUS
OUTPATIENT
Start: 2023-06-01

## 2023-06-02 PROBLEM — R10.2 PELVIC PAIN: Status: ACTIVE | Noted: 2023-06-02

## 2023-06-02 PROBLEM — N80.9 ENDOMETRIOSIS: Status: ACTIVE | Noted: 2023-06-02

## 2023-06-02 RX ORDER — LIDOCAINE HYDROCHLORIDE 20 MG/ML
5 SOLUTION OROPHARYNGEAL AS NEEDED
Qty: 40 ML | Refills: 5 | Status: SHIPPED | OUTPATIENT
Start: 2023-06-02

## 2023-07-12 ENCOUNTER — PROCEDURE VISIT (OUTPATIENT)
Dept: OBSTETRICS AND GYNECOLOGY | Facility: CLINIC | Age: 38
End: 2023-07-12
Payer: COMMERCIAL

## 2023-07-12 VITALS
SYSTOLIC BLOOD PRESSURE: 112 MMHG | BODY MASS INDEX: 21.24 KG/M2 | WEIGHT: 143.4 LBS | HEIGHT: 69 IN | DIASTOLIC BLOOD PRESSURE: 60 MMHG

## 2023-07-12 DIAGNOSIS — N93.9 ABNORMAL UTERINE BLEEDING (AUB): Primary | ICD-10-CM

## 2023-07-12 DIAGNOSIS — Z12.4 PAP SMEAR FOR CERVICAL CANCER SCREENING: ICD-10-CM

## 2023-07-12 NOTE — PROGRESS NOTES
[facility]  Gynecology  Date of Service: 2023    CC: EMB/pap/FU    HPI  Aide Jenkins is a 37 y.o.  premenopausal female who presents with complaints of pelvic/back cramping, irregular bleeding..       Mirena IUD placed 2016. Had stopped periods, occasional pain but had been better, but ended up having tubal and ablation  for pelvic pain. Initially no periods after ablation.      6 mos ago pain getting worse and starting to bleed again. Irregular, no cycle. Couple of times per month, hours to few days. Sometimes heavy enough for tampon, other times pantyliner. No dyspareunia, no bleeding with intercourse. Usually pain just with bleeding, sometimes cramping other times. Usually just manages, sometimes tylenol/ibuprofen.     H/o PSVT and h/o V [id number]. 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 Occ palpitations, much better on Metoprolol. Sees him 2x per year.  EMB [place] 2020 benign  2020 NIL     Denies any vaginal itching, burning, irritation, or discharge. Denies any sexual dysfunction concerns. Not reporting any urinary symptoms other than KVNG since deliveries. Leaks most days.    Since last seen states 2-3 days of bleeding over last 2 weeks.    Prelim US: Uterus 8.97 x 4.4 x 6.2 cm, heterogeneous echotexture, ES 0.55 cm with small fluid within endometrial canal, ROV 1.9 x 3.5 x 2.5 cm, LOV 1.9 x 2.7 x 1.9 cm, small free fluid in cul-de-sac    ROS  Review of Systems   Constitutional: Negative.    HENT: Negative.     Respiratory: Negative.     Cardiovascular: Negative.    Gastrointestinal: Negative.    Genitourinary:  Positive for menstrual problem and vaginal bleeding.   Skin: Negative.    Psychiatric/Behavioral: Negative.       GYN HISTORY  Menarche: 9 yo  Menses: see above  History of STIs: HPV warts, removed previously, no recurrence  Last pap smear:     Last Completed Pap Smear       This patient has no relevant Health Maintenance data.          Abnormal pap smear  history: ASCUS previously with yeast; repeat was normal  Contraception: tubal     OB HISTORY  OB History    Para Term  AB Living   3 3 3     3   SAB IAB Ectopic Molar Multiple Live Births             3      # Outcome Date GA Lbr Saeed/2nd Weight Sex Delivery Anes PTL Lv   3 Term 11/10/20 39w1d / 00:33 4150 g (9 lb 2.4 oz) M Vag-Spont EPI N ALMA   2 Term 2016     Vag-Spont   ALMA   1 Term 2013     Vag-Spont   ALMA     PAST MEDICAL HISTORY  Past Medical History:   Diagnosis Date    Allergic rhinitis     Anxiety     Atrial septal defect     s/p repair 2013     Atypical chest pain     Chest pain     Chronic pelvic pain in female     Chronic tension-type headache     Dyspnea     Edema, lower extremity     Endometriosis     Fatigue     Fibrocystic breast     GERMAINE (generalized anxiety disorder) 2018    Heart disease     Infertility, female     Irritable bowel syndrome with diarrhea 2018    Leukorrhea     not specified as infective    Migraine variant     Nausea     episodic    Nephrolithiasis 2018    Normocytic anemia     Palpitations     SVT (supraventricular tachycardia) 2022    V tach 2022     PAST SURGICAL HISTORY  Past Surgical History:   Procedure Laterality Date    ASD REPAIR, OSTIUM PRIMUM      CHOLECYSTECTOMY      lsc    COLONOSCOPY      ENDOMETRIAL ABLATION      INJECTION OF MEDICATION  2014    kenalog    PELVIC LAPAROSCOPY      States 2 surgeries for endometriosis    SALPINGECTOMY      WISDOM TOOTH EXTRACTION     lsc jina  lsc dx x 2 for endo, tubal x1, endometrial ablation    FAMILY HISTORY  Family History   Problem Relation Age of Onset    Hypertension Paternal Grandmother     Coronary artery disease Paternal Grandmother     Hypertension Maternal Grandmother     Colon cancer Other     Breast cancer Neg Hx     Endometrial cancer Neg Hx     Ovarian cancer Neg Hx     Uterine cancer Neg Hx      SOCIAL HISTORY  Social History     Socioeconomic History     Marital status:    Tobacco Use    Smoking status: Never     Passive exposure: Past    Smokeless tobacco: Never   Substance and Sexual Activity    Alcohol use: No    Drug use: No    Sexual activity: Yes     Partners: Male     Birth control/protection: Tubal ligation     Comment: tubes removed     ALLERGIES  Allergies   Allergen Reactions    Ceclor [Cefaclor] Rash     swelling    Erythromycin Rash    Iodine Rash    Mobic [Meloxicam] Other (See Comments)     Mouth sores    Zithromax [Azithromycin] Rash     HOME MEDICATIONS  Prior to Admission medications    Medication Sig Start Date End Date Taking? Authorizing Provider   cetirizine (zyrTEC) 10 MG tablet Take 1 tablet by mouth Daily for 10 days.  Patient taking differently: Take 1 tablet by mouth Daily As Needed. 6/10/22 5/16/23  Killian Angulo MD   cyclobenzaprine (FLEXERIL) 10 MG tablet Take 1 tablet by mouth At Night As Needed for Muscle Spasms. 5/16/23   Kevyn Cronin MD   Lidocaine Viscous HCl (XYLOCAINE) 2 % solution Take 5 mL by mouth As Needed for Mild Pain. 6/2/23   Kevyn Cronin MD   meloxicam (MOBIC) 15 MG tablet Take 1 tablet by mouth Daily As Needed (pain). Take with food 5/16/23   Kevyn Cronin MD   metoprolol succinate XL (TOPROL-XL) 25 MG 24 hr tablet Take 1 tablet by mouth Daily. 3/4/22 5/16/23  Emergency, Nurse Chad, RN   ondansetron (ZOFRAN) 4 MG tablet TAKE 1 TABLET BY MOUTH EVERY 8 HOURS AS NEEDED FOR NAUSEA AND VOMITING 6/9/22   Kevyn Cronin MD   tiZANidine (ZANAFLEX) 4 MG tablet 1/2-1 bid prn 5/16/23   Kevyn Cronin MD     PE  There were no vitals taken for this visit.       General: Alert, healthy, no distress, well nourished and well developed.  Neurologic: Alert, oriented to person, place, and time.  Gait normal.  Cranial nerves II-XII grossly intact.  HEENT: Normocephalic, atraumatic.  Extraocular muscles intact.  Lungs: Normal respiratory effort.   Abdomen: Soft, non-tender, non-distended,no masses, no hepatosplenomegaly,  no hernia.  Skin: No rash, no lesions.  Extremities: No cyanosis, clubbing or edema.  PELVIC EXAM:  External Genitalia/Vulva: Anatomy is normal, no significant redness of labia, no discharge on vulvar tissues, [place]'s and [place]'s glands are normal, no ulcers, no condylomatous lesions.  Urethral meatus: Normal, no lesions, no prolapse.  Urethra: Normal, no masses, no tenderness with palpation.  Bladder: Normal, no fullness, no masses, no tenderness with palpation.  Vagina: Vaginal tissues are not inflamed, normal color and texture, no significant discharge present.  Pelvic support adequate.  Cervix: Normal, no lesions, no purulent discharge, no cervical motion tenderness.  Uterus: Normal size, shape, and consistency.  Good mobility noted.  Minimal descent noted with good support.  Adnexa: Normal size and shape bilaterally, no palpable mass bilaterally and non-tender bilaterally.  Rectal: PREETHI deferred.    Endometrial Biopsy    Date: 7/12/23  Performed by: Suad Acosta DO  Authorized by: Suad Acosta DO     Consent:     Consent obtained: verbal and written    Consent given by: patient    Risks discussed: bleeding, infection, need for repeat procedure and pain    Alternatives discussed: alternative treatment and observation    Patient agrees, verbalizes understanding, and wants to proceed: yes    Indications:     Indications: abnormal uterine bleeding    Procedure:     Prepped with: Betadine    Tenaculum used: yes      Cervix dilated: no    Findings:     Cervix: normal      Specimen collected: specimen collected and sent to pathology      Patient tolerance: tolerated well, no immediate complications  Comments:     Procedure comments: Endometrial biopsy was performed following verbal consent including risks of infection, bleeding, perforation, pain, need for additional procedures. Speculum was placed and cervix was adequately visualized. Cetacaine sprayed on cervix. Betadine was used to cleanse cervix x 3.  Tenaculum was placed on anterior cervix. Pipelle was used to sound uterus to 8 cm and 1 pass was/were performed with moderate amount of blood tissue collected. The tenaculum was removed. Tenaculum sites were hemostatic and scant blood noted from cervical os. Speculum was removed. Patient tolerated procedure well.     RENEE Jenkins is a 37 y.o.  presenting with  for pap, EMB, FU for pelvic pain and irregular bleeding with h/o endometrial ablation and endometriosis desiring definitive management with hysterectomy.     PLAN    1. Abnormal uterine bleeding (AUB)  2. Endometriosis  3. Pelvic pain  - US with heterogeneous echotexture, suspect adenomyosis but could also be related to h/o ablation or leiomyomatous changes  - Discussed options, including medical (progesterone only therapy) or surgical management with hysterectomy  - Patient strongly desires hysterectomy for definitive management  - Will plan for robotic assisted total laparoscopic hysterectomy, bilateral salpingectomy, possible unilateral or bilateral oophorectomy, cystoscopy, any other indicated procedure  - Discussed risks/benefits of surgery: risk of infection, bleeding, damage to surrounding structures,  need for additional procedures; discussed risk of anesthesia including heart attack, stroke, and death; plan for outpatient surgery discussed.  - Will have increased risk of injury to bowel/bladder/blood vessels/ureters, increased need for oophorectomy with h/o endometriosis; robot will help to decrease risk, but could require laparotomy which would necessitate 1-2 days in hospital typically  - Will plan for now for ovarian conservation, if needed to remove both ovaries for surgical safety, bleeding, significant pelvic adhesions, would recommend HRT for prevention of premature osteoporosis and vasomotor symptoms  - With h/o SVT and V fib unsustained will have patient followup with Dr. Leiva for clearance-surgery could be  prolonged with h/o endometriosis pending scarring, will require steep trendelenberg positioning>>intermediate risk with Dr. Leiva per Cardiac risk assessment form, will see him again preop; dated 6/21/23  - Scheduled surgery        This document has been electronically signed by Suad Acosta DO on July 12, 2023 10:11 CDT

## 2023-07-13 LAB
REF LAB TEST METHOD: NORMAL
REF LAB TEST METHOD: NORMAL

## 2023-08-03 RX ORDER — TIZANIDINE 4 MG/1
TABLET ORAL
Qty: 30 TABLET | Refills: 11 | Status: SHIPPED | OUTPATIENT
Start: 2023-08-03

## 2023-09-28 RX ORDER — ONDANSETRON 4 MG/1
TABLET, FILM COATED ORAL
Qty: 12 TABLET | Refills: 0 | Status: SHIPPED | OUTPATIENT
Start: 2023-09-28